# Patient Record
Sex: FEMALE | Race: BLACK OR AFRICAN AMERICAN | NOT HISPANIC OR LATINO | Employment: OTHER | URBAN - METROPOLITAN AREA
[De-identification: names, ages, dates, MRNs, and addresses within clinical notes are randomized per-mention and may not be internally consistent; named-entity substitution may affect disease eponyms.]

---

## 2018-03-21 ENCOUNTER — NURSING HOME VISIT (OUTPATIENT)
Dept: GERIATRICS | Facility: CLINIC | Age: 83
End: 2018-03-21
Payer: MEDICARE

## 2018-03-21 VITALS
HEART RATE: 56 BPM | SYSTOLIC BLOOD PRESSURE: 122 MMHG | WEIGHT: 178.2 LBS | BODY MASS INDEX: 31.57 KG/M2 | HEIGHT: 63 IN | TEMPERATURE: 97.5 F | OXYGEN SATURATION: 92 % | RESPIRATION RATE: 17 BRPM | DIASTOLIC BLOOD PRESSURE: 70 MMHG

## 2018-03-21 VITALS
SYSTOLIC BLOOD PRESSURE: 121 MMHG | BODY MASS INDEX: 31.57 KG/M2 | HEART RATE: 62 BPM | TEMPERATURE: 97.5 F | WEIGHT: 178.2 LBS | OXYGEN SATURATION: 94 % | HEIGHT: 63 IN | RESPIRATION RATE: 18 BRPM | DIASTOLIC BLOOD PRESSURE: 70 MMHG

## 2018-03-21 DIAGNOSIS — Z79.01 ANTICOAGULATION MANAGEMENT ENCOUNTER: ICD-10-CM

## 2018-03-21 DIAGNOSIS — Z51.81 ANTICOAGULATION MANAGEMENT ENCOUNTER: ICD-10-CM

## 2018-03-21 DIAGNOSIS — M17.0 PRIMARY OSTEOARTHRITIS OF BOTH KNEES: ICD-10-CM

## 2018-03-21 DIAGNOSIS — M35.3 PMR (POLYMYALGIA RHEUMATICA) (H): ICD-10-CM

## 2018-03-21 DIAGNOSIS — Z79.4 TYPE 2 DIABETES MELLITUS WITH DIABETIC NEUROPATHY, WITH LONG-TERM CURRENT USE OF INSULIN (H): ICD-10-CM

## 2018-03-21 DIAGNOSIS — I26.99 OTHER PULMONARY EMBOLISM WITHOUT ACUTE COR PULMONALE, UNSPECIFIED CHRONICITY (H): ICD-10-CM

## 2018-03-21 DIAGNOSIS — E11.40 TYPE 2 DIABETES MELLITUS WITH DIABETIC NEUROPATHY, WITH LONG-TERM CURRENT USE OF INSULIN (H): ICD-10-CM

## 2018-03-21 DIAGNOSIS — R53.81 PHYSICAL DECONDITIONING: ICD-10-CM

## 2018-03-21 DIAGNOSIS — M53.3 SACRO-ILIAC PAIN: Primary | ICD-10-CM

## 2018-03-21 DIAGNOSIS — I10 ESSENTIAL HYPERTENSION: ICD-10-CM

## 2018-03-21 DIAGNOSIS — D50.0 IRON DEFICIENCY ANEMIA DUE TO CHRONIC BLOOD LOSS: ICD-10-CM

## 2018-03-21 PROBLEM — I51.7 CARDIOMEGALY: Status: ACTIVE | Noted: 2018-03-21

## 2018-03-21 PROBLEM — K25.9: Status: ACTIVE | Noted: 2018-03-21

## 2018-03-21 PROBLEM — J98.01 ACUTE BRONCHOSPASM: Status: ACTIVE | Noted: 2018-03-21

## 2018-03-21 PROBLEM — J30.89 ALLERGIC RHINITIS DUE TO OTHER ALLERGEN: Status: ACTIVE | Noted: 2018-03-21

## 2018-03-21 PROBLEM — R20.9 DISTURBANCE OF SKIN SENSATION: Status: ACTIVE | Noted: 2018-03-21

## 2018-03-21 PROBLEM — H40.9 GLAUCOMA: Status: ACTIVE | Noted: 2018-03-21

## 2018-03-21 PROCEDURE — 99310 SBSQ NF CARE HIGH MDM 45: CPT | Performed by: NURSE PRACTITIONER

## 2018-03-21 RX ORDER — POLYETHYLENE GLYCOL 1450
17 POWDER (GRAM) MISCELLANEOUS DAILY
COMMUNITY

## 2018-03-21 RX ORDER — ALBUTEROL SULFATE 90 UG/1
2 AEROSOL, METERED RESPIRATORY (INHALATION) 4 TIMES DAILY PRN
COMMUNITY

## 2018-03-21 RX ORDER — TIMOLOL MALEATE 5 MG/ML
1 SOLUTION/ DROPS OPHTHALMIC DAILY
COMMUNITY

## 2018-03-21 RX ORDER — BISACODYL 10 MG
10 SUPPOSITORY, RECTAL RECTAL DAILY
COMMUNITY
End: 2018-03-28

## 2018-03-21 RX ORDER — LIDOCAINE 50 MG/G
1 PATCH TOPICAL EVERY 24 HOURS
COMMUNITY
End: 2018-03-28

## 2018-03-21 NOTE — PROGRESS NOTES
[place] [facility]  PRIMARY CARE PROVIDER AND CLINIC:  Laurence Abbott   Chief Complaint   Patient presents with     Hospital F/U       HPI:    Shira Gil is a 83 year old  (1935),admitted to the [facility] from [facility] .  Hospital stay 03/16/2018 through 03/20/2018.  Admitted to this facility for  rehab, medical management and nursing care.  HPI information obtained from: facility chart records, facility staff, patient report, [facility] chart review and Care Everywhere [facility] chart review.  Current issues are:      Sacro-iliac pain  Patient transferred to TCU for rehab following hospital stay due to left buttocks pain and pain in left leg. She was evaluated by ortho: no evidence of fracture. MRI of pelvis and lumbar spine- tendinosis and mild trochanteric edema bilaterally. Likely tendinosis is cause of pain. She was treated with flexeril, gabapentin and lidoderm patch. Today she reports less pain.   Knee pain- she does receive cortisone shots for this.   Type 2 diabetes mellitus with diabetic neuropathy, with long-term current use of insulin (H)  PTA taking lantus 26u and glipizide. BG in hospital running 130-260  BGs in TCU running around 100.   PMR (polymyalgia rheumatica) (H)  Remission for 2 years. No shoulder or back pain. Not on prednisone at this time     Other pulmonary embolism without acute cor pulmonale, unspecified chronicity (H)- patient describes two episodes of PULMONARY EMBOLISM. Second one occurring after coumadin stopped.   Anticoagulation management encounter  IVC filter in place.   INR today 4.   Essential hypertension  BP's: 122/70, 117/68  Taking PTA metop, lasix, losartan and norvasc  Iron deficiency anemia due to chronic blood loss  hgb 10.4 upon adm to hospital    EGD 2/2013 with dilated esophagus and large hiatal hernia      Physical deconditioning  Lives alone in senior apartment. Has supportive family. Has been mostly  independent. She uses a cane. Today able to walk to BR and back to bed.        CODE STATUS/ADVANCE DIRECTIVES DISCUSSION:   CPR/Full code   Patient's living condition: lives alone    ALLERGIES:Lisinopril  PAST MEDICAL HISTORY:  has no past medical history on file.  PAST SURGICAL HISTORY:  has no past surgical history on file.  FAMILY HISTORY: family history is not on file.  SOCIAL HISTORY:      Post Discharge Medication Reconciliation Status: discharge medications reconciled, continue medications without change.  Current Outpatient Prescriptions   Medication Sig Dispense Refill     ACETAMINOPHEN PO Take 1,000 mg by mouth 3 times daily Max dose 4000mg in 24 hrs       bisacodyl (DULCOLAX) 10 MG Suppository Place 10 mg rectally daily       FUROSEMIDE PO Take 20 mg by mouth 2 times daily       GABAPENTIN PO Take 200 mg by mouth 3 times daily For 14 days.       GLIPIZIDE PO Take 5 mg by mouth 2 times daily (before meals)       insulin glargine (LANTUS) 100 UNIT/ML injection Inject 26 Units Subcutaneous every 72 hours INCREASE BY 2 UNITS Q 3days UNTIL BEDTIME BLOOD SUGAR -140       lidocaine (LIDODERM) 5 % Patch Place 1 patch onto the skin every 24 hours       LOSARTAN POTASSIUM PO Take 100 mg by mouth daily       MECLIZINE HCL PO Take 25 mg by mouth 2 times daily as needed for dizziness       METOPROLOL TARTRATE PO Take 50 mg by mouth 2 times daily       AMLODIPINE BESYLATE PO Take 10 mg by mouth daily       OMEPRAZOLE PO Take 20 mg by mouth every morning       Polyethylene Glycol 1450 POWD Take 17 g by mouth daily as needed       PRAVASTATIN SODIUM PO Take 40 mg by mouth At Bedtime       albuterol (PROAIR HFA/PROVENTIL HFA/VENTOLIN HFA) 108 (90 BASE) MCG/ACT Inhaler Inhale 2 puffs into the lungs 4 times daily as needed for shortness of breath / dyspnea or wheezing       Sennosides 17.2 MG TABS Take 17.2 mg by mouth 2 times daily       timolol (TIMOPTIC) 0.5 % ophthalmic solution Place 1 drop into both eyes daily    "    VITAMIN D, CHOLECALCIFEROL, PO Take 2,000 Units by mouth daily       OXYBUTYNIN CHLORIDE PO Take 5 mg by mouth 2 times daily         ROS:  4 point ROS including Respiratory, CV, GI and , other than that noted in the HPI,  is negative    Exam:  /70  Pulse 56  Temp 97.5  F (36.4  C)  Resp 17  Ht 5' 3\" (1.6 m)  Wt 178 lb 3.2 oz (80.8 kg)  SpO2 92%  BMI 31.57 kg/m2  GENERAL APPEARANCE:  Alert, in no distress  ENT:  Mouth and posterior oropharynx normal, moist mucous membranes, hearing acuity adequate   EYES:  EOM, conjunctivae, lids, pupils and irises normal  NECK:  No adenopathy,masses or thyromegaly  RESP:  respiratory effort and palpation of chest normal, no respiratory distress, Lung sounds clear  CV:  Palpation and auscultation of heart done , rate and rhythm reg, no murmur, no rub or gallop, Edema none  ABDOMEN:  normal bowel sounds, soft, nontender, no hepatosplenomegaly or other masses  M/S:   Gait and station steady, Digits and nails normal   SKIN:  Inspection/Palpation of skin and subcutaneous tissue no rash  NEURO: 2-12 in normal limits and at patient's baseline  PSYCH:  insight and judgement, memory intact , affect and mood normal      Lab/Diagnostic data:  [facility] labs:  GLUCOSE METER (03/20/2018 12:37 PM)  GLUCOSE METER (03/20/2018 12:37 PM)   Component Value Ref Range   GLUCOSE METER 170 (H) 65 - 100 mg/dL     PROTIME-INR (03/20/2018 7:16 AM)  PROTIME-INR (03/20/2018 7:16 AM)   Component Value Ref Range   INR 3.3 (H) <1.3   PROTIME 32.7 (H) 11.7 - 14.1 sec     CBC WITH AUTO DIFFERENTIAL (03/17/2018 6:17 AM)  CBC WITH AUTO DIFFERENTIAL (03/17/2018 6:17 AM)   Component Value Ref Range   WHITE BLOOD COUNT  9.2 4.5 - 11.0 thou/cu mm   RED BLOOD COUNT  3.97 (L) 4.00 - 5.20 mil/cu mm   HEMOGLOBIN  10.1 (L) 12.0 - 16.0 g/dL   HEMATOCRIT  31.5 (L) 33.0 - 51.0 %   MCV  79 (L) 80 - 100 fL   MCH  25.4 (L) 26.0 - 34.0 pg   MCHC  32.1 32.0 - 36.0 g/dL   RDW  16.5 (H) 11.5 - 15.5 % "   PLATELET COUNT  226 140 - 440 thou/cu mm   MPV  10.8 6.5 - 11.0 fL   NEUTROPHILS  75.5 (H) 42.0 - 72.0 %   LYMPHOCYTES  16.5 (L) 20.0 - 44.0 %   MONOCYTES  6.5 <12.0 %   EOSINOPHILS  0.9 <8.0 %   BASOPHILS  0.1 <3.0 %   IMMATURE GRANULOCYTES(METAS,MYELOS,PROS) 0.5 %   ABSOLUTE NEUTROPHILS  7.0 1.7 - 7.0 thou/cu mm   ABSOLUTE LYMPHOCYTES  1.5 0.9 - 2.9 thou/cu mm   ABSOLUTE MONOCYTES  0.6 <0.9 thou/cu mm   ABSOLUTE EOSINOPHILS  0.1 <0.5 thou/cu mm   ABSOLUTE BASOPHILS  0.0 <0.3 thou/cu mm   ABSOLUTE IMMATURE GRANULOCYTES(METAS,MYELOS,PROS) 0.1 <0.3 thou/cu mm      Basic Metabolic Panel (03/17/2018 6:17 AM)  Basic Metabolic Panel (03/17/2018 6:17 AM)   Component Value Ref Range   SODIUM 138 135 - 145 mmol/L   POTASSIUM 4.2 3.5 - 5.0 mmol/L   CHLORIDE 108 98 - 110 mmol/L   CO2,TOTAL 22 21 - 31 mmol/L   ANION GAP 8 5 - 18    GLUCOSE 166 (H) 65 - 100 mg/dL   CALCIUM 8.7 8.5 - 10.5 mg/dL   BUN 19 8 - 25 mg/dL   CREATININE 1.05 0.57 - 1.11 mg/dL   BUN/CREAT RATIO  18 10 - 20    GFR if African American >60 >60 ml/min/1.73m2   GFR if not African American 50 (L) >60 ml/min/1.73m2       ASSESSMENT/PLAN:  (M53.3) Sacro-iliac pain  (primary encounter diagnosis)  Comment: recent hospitalization due to left buttocks pain now feeling better. She is moving well in her room  Plan: continue current pain meds. physical therapy     (M17.0) Primary osteoarthritis of both knees  Comment: no c/p pain today.   Plan: physical therapy     (E11.40,  Z79.4) Type 2 diabetes mellitus with diabetic neuropathy, with long-term current use of insulin (H)  Comment: BGs are well controlled. She did come to us with an order to increase lantus every 2 days but it appears this is an old order.   Plan: lantus 26u q am    (M35.3) PMR (polymyalgia rheumatica) (H)  Comment: asymptomatic   Plan: monitor     (I26.99) Other pulmonary embolism without acute cor pulmonale, unspecified chronicity (H)  (Z51.81,  Z79.01) Anticoagulation management  encounter  Comment: INR supratherapeutic.   Plan: hold coumadin for two days. Recheck INR 3/23    (I10) Essential hypertension  Comment: adequate control. Almost too low.   Plan: monitor BPs adjust meds as necessary    (D50.0) Iron deficiency anemia due to chronic blood loss  Comment: at baseline. Mention of gastric ulcer but I do not see any encounters regarding this  Plan: monitor     (R53.81) Physical deconditioning  Comment: due to musculoskeletal issue.   Plan: physical therapy and OCCUPATIONAL THERAPY. Suspect will be able to return to previous setting.     Orders:  DISCONTINUE lantus order to increase by 2 u q days.   Lantus 26u q day.  Hold coumadin for 2 days  Recheck INR 3/23    Bmp, CBC 3/23      Electronically signed by:  ARIN Mcmillan CNP

## 2018-03-21 NOTE — LETTER
3/21/2018        RE: Shira Gil  [address] [place] [address]        [place] [facility]  PRIMARY CARE PROVIDER AND CLINIC:  Ara Dang   Chief Complaint   Patient presents with     Hospital F/U       HPI:    Shira Gil is a 83 year old  (1935),admitted to the [facility] from [facility] .  Hospital stay 03/16/2018 through 03/20/2018.  Admitted to this facility for  rehab, medical management and nursing care.  HPI information obtained from: facility chart records, facility staff, patient report, [facility] chart review and Care Everywhere [facility] chart review.  Current issues are:      Sacro-iliac pain  Patient transferred to TCU for rehab following hospital stay due to left buttocks pain and pain in left leg. She was evaluated by ortho: no evidence of fracture. MRI of pelvis and lumbar spine- tendinosis and mild trochanteric edema bilaterally. Likely tendinosis is cause of pain. She was treated with flexeril, gabapentin and lidoderm patch. Today she reports less pain.   Knee pain- she does receive cortisone shots for this.   Type 2 diabetes mellitus with diabetic neuropathy, with long-term current use of insulin (H)  PTA taking lantus 26u and glipizide. BG in hospital running 130-260  BGs in TCU running around 100.   PMR (polymyalgia rheumatica) (H)  Remission for 2 years. No shoulder or back pain. Not on prednisone at this time     Other pulmonary embolism without acute cor pulmonale, unspecified chronicity (H)- patient describes two episodes of PULMONARY EMBOLISM. Second one occurring after coumadin stopped.   Anticoagulation management encounter  IVC filter in place.   INR today 4.   Essential hypertension  BP's: 122/70, 117/68  Taking PTA metop, lasix, losartan and norvasc  Iron deficiency anemia due to chronic blood loss  hgb 10.4 upon adm to hospital    EGD 2/2013 with dilated esophagus and large hiatal hernia      Physical  deconditioning  Lives alone in senior apartment. Has supportive family. Has been mostly independent. She uses a cane. Today able to walk to BR and back to bed.        CODE STATUS/ADVANCE DIRECTIVES DISCUSSION:   CPR/Full code   Patient's living condition: lives alone    ALLERGIES:Lisinopril  PAST MEDICAL HISTORY:  has no past medical history on file.  PAST SURGICAL HISTORY:  has no past surgical history on file.  FAMILY HISTORY: family history is not on file.  SOCIAL HISTORY:      Post Discharge Medication Reconciliation Status: discharge medications reconciled, continue medications without change.  Current Outpatient Prescriptions   Medication Sig Dispense Refill     ACETAMINOPHEN PO Take 1,000 mg by mouth 3 times daily Max dose 4000mg in 24 hrs       bisacodyl (DULCOLAX) 10 MG Suppository Place 10 mg rectally daily       FUROSEMIDE PO Take 20 mg by mouth 2 times daily       GABAPENTIN PO Take 200 mg by mouth 3 times daily For 14 days.       GLIPIZIDE PO Take 5 mg by mouth 2 times daily (before meals)       insulin glargine (LANTUS) 100 UNIT/ML injection Inject 26 Units Subcutaneous every 72 hours INCREASE BY 2 UNITS Q 3days UNTIL BEDTIME BLOOD SUGAR -140       lidocaine (LIDODERM) 5 % Patch Place 1 patch onto the skin every 24 hours       LOSARTAN POTASSIUM PO Take 100 mg by mouth daily       MECLIZINE HCL PO Take 25 mg by mouth 2 times daily as needed for dizziness       METOPROLOL TARTRATE PO Take 50 mg by mouth 2 times daily       AMLODIPINE BESYLATE PO Take 10 mg by mouth daily       OMEPRAZOLE PO Take 20 mg by mouth every morning       Polyethylene Glycol 1450 POWD Take 17 g by mouth daily as needed       PRAVASTATIN SODIUM PO Take 40 mg by mouth At Bedtime       albuterol (PROAIR HFA/PROVENTIL HFA/VENTOLIN HFA) 108 (90 BASE) MCG/ACT Inhaler Inhale 2 puffs into the lungs 4 times daily as needed for shortness of breath / dyspnea or wheezing       Sennosides 17.2 MG TABS Take 17.2 mg by mouth 2 times  "daily       timolol (TIMOPTIC) 0.5 % ophthalmic solution Place 1 drop into both eyes daily       VITAMIN D, CHOLECALCIFEROL, PO Take 2,000 Units by mouth daily       OXYBUTYNIN CHLORIDE PO Take 5 mg by mouth 2 times daily         ROS:  4 point ROS including Respiratory, CV, GI and , other than that noted in the HPI,  is negative    Exam:  /70  Pulse 56  Temp 97.5  F (36.4  C)  Resp 17  Ht 5' 3\" (1.6 m)  Wt 178 lb 3.2 oz (80.8 kg)  SpO2 92%  BMI 31.57 kg/m2  GENERAL APPEARANCE:  Alert, in no distress  ENT:  Mouth and posterior oropharynx normal, moist mucous membranes, hearing acuity adequate   EYES:  EOM, conjunctivae, lids, pupils and irises normal  NECK:  No adenopathy,masses or thyromegaly  RESP:  respiratory effort and palpation of chest normal, no respiratory distress, Lung sounds clear  CV:  Palpation and auscultation of heart done , rate and rhythm reg, no murmur, no rub or gallop, Edema none  ABDOMEN:  normal bowel sounds, soft, nontender, no hepatosplenomegaly or other masses  M/S:   Gait and station steady, Digits and nails normal   SKIN:  Inspection/Palpation of skin and subcutaneous tissue no rash  NEURO: 2-12 in normal limits and at patient's baseline  PSYCH:  insight and judgement, memory intact , affect and mood normal      Lab/Diagnostic data:  [facility] labs:  GLUCOSE METER (03/20/2018 12:37 PM)  GLUCOSE METER (03/20/2018 12:37 PM)   Component Value Ref Range   GLUCOSE METER 170 (H) 65 - 100 mg/dL     PROTIME-INR (03/20/2018 7:16 AM)  PROTIME-INR (03/20/2018 7:16 AM)   Component Value Ref Range   INR 3.3 (H) <1.3   PROTIME 32.7 (H) 11.7 - 14.1 sec     CBC WITH AUTO DIFFERENTIAL (03/17/2018 6:17 AM)  CBC WITH AUTO DIFFERENTIAL (03/17/2018 6:17 AM)   Component Value Ref Range   WHITE BLOOD COUNT  9.2 4.5 - 11.0 thou/cu mm   RED BLOOD COUNT  3.97 (L) 4.00 - 5.20 mil/cu mm   HEMOGLOBIN  10.1 (L) 12.0 - 16.0 g/dL   HEMATOCRIT  31.5 (L) 33.0 - 51.0 %   MCV  79 (L) 80 - 100 fL "   MCH  25.4 (L) 26.0 - 34.0 pg   MCHC  32.1 32.0 - 36.0 g/dL   RDW  16.5 (H) 11.5 - 15.5 %   PLATELET COUNT  226 140 - 440 thou/cu mm   MPV  10.8 6.5 - 11.0 fL   NEUTROPHILS  75.5 (H) 42.0 - 72.0 %   LYMPHOCYTES  16.5 (L) 20.0 - 44.0 %   MONOCYTES  6.5 <12.0 %   EOSINOPHILS  0.9 <8.0 %   BASOPHILS  0.1 <3.0 %   IMMATURE GRANULOCYTES(METAS,MYELOS,PROS) 0.5 %   ABSOLUTE NEUTROPHILS  7.0 1.7 - 7.0 thou/cu mm   ABSOLUTE LYMPHOCYTES  1.5 0.9 - 2.9 thou/cu mm   ABSOLUTE MONOCYTES  0.6 <0.9 thou/cu mm   ABSOLUTE EOSINOPHILS  0.1 <0.5 thou/cu mm   ABSOLUTE BASOPHILS  0.0 <0.3 thou/cu mm   ABSOLUTE IMMATURE GRANULOCYTES(METAS,MYELOS,PROS) 0.1 <0.3 thou/cu mm      Basic Metabolic Panel (03/17/2018 6:17 AM)  Basic Metabolic Panel (03/17/2018 6:17 AM)   Component Value Ref Range   SODIUM 138 135 - 145 mmol/L   POTASSIUM 4.2 3.5 - 5.0 mmol/L   CHLORIDE 108 98 - 110 mmol/L   CO2,TOTAL 22 21 - 31 mmol/L   ANION GAP 8 5 - 18    GLUCOSE 166 (H) 65 - 100 mg/dL   CALCIUM 8.7 8.5 - 10.5 mg/dL   BUN 19 8 - 25 mg/dL   CREATININE 1.05 0.57 - 1.11 mg/dL   BUN/CREAT RATIO  18 10 - 20    GFR if African American >60 >60 ml/min/1.73m2   GFR if not African American 50 (L) >60 ml/min/1.73m2       ASSESSMENT/PLAN:  (M53.3) Sacro-iliac pain  (primary encounter diagnosis)  Comment: recent hospitalization due to left buttocks pain now feeling better. She is moving well in her room  Plan: continue current pain meds. physical therapy     (M17.0) Primary osteoarthritis of both knees  Comment: no c/p pain today.   Plan: physical therapy     (E11.40,  Z79.4) Type 2 diabetes mellitus with diabetic neuropathy, with long-term current use of insulin (H)  Comment: BGs are well controlled. She did come to us with an order to increase lantus every 2 days but it appears this is an old order.   Plan: lantus 26u q am    (M35.3) PMR (polymyalgia rheumatica) (H)  Comment: asymptomatic   Plan: monitor     (I26.99) Other pulmonary embolism without acute cor pulmonale,  unspecified chronicity (H)  (Z51.81,  Z79.01) Anticoagulation management encounter  Comment: INR supratherapeutic.   Plan: hold coumadin for two days. Recheck INR 3/23    (I10) Essential hypertension  Comment: adequate control. Almost too low.   Plan: monitor BPs adjust meds as necessary    (D50.0) Iron deficiency anemia due to chronic blood loss  Comment: at baseline. Mention of gastric ulcer but I do not see any encounters regarding this  Plan: monitor     (R53.81) Physical deconditioning  Comment: due to musculoskeletal issue.   Plan: physical therapy and OCCUPATIONAL THERAPY. Suspect will be able to return to previous setting.     Orders:  DISCONTINUE lantus order to increase by 2 u q days.   Lantus 26u q day.  Hold coumadin for 2 days  Recheck INR 3/23    Bmp, CBC 3/23      Electronically signed by:  ARIN Mcmillan CNP                    Sincerely,        ARIN Mcmillan CNP

## 2018-03-22 ENCOUNTER — NURSING HOME VISIT (OUTPATIENT)
Dept: GERIATRICS | Facility: CLINIC | Age: 83
End: 2018-03-22
Payer: MEDICARE

## 2018-03-22 DIAGNOSIS — I10 ESSENTIAL HYPERTENSION: ICD-10-CM

## 2018-03-22 DIAGNOSIS — M53.3 SACRO-ILIAC PAIN: ICD-10-CM

## 2018-03-22 DIAGNOSIS — R53.81 PHYSICAL DECONDITIONING: Primary | ICD-10-CM

## 2018-03-22 DIAGNOSIS — M25.552 HIP PAIN, LEFT: ICD-10-CM

## 2018-03-22 DIAGNOSIS — E11.9 DM TYPE 2, GOAL HBA1C < 7% (H): ICD-10-CM

## 2018-03-22 PROCEDURE — 99305 1ST NF CARE MODERATE MDM 35: CPT | Performed by: INTERNAL MEDICINE

## 2018-03-22 NOTE — PROGRESS NOTES
PRIMARY CARE PROVIDER AND CLINIC RESPONSIBLE:  Ara Dang,         ADMISSION HISTORY AND PHYSICAL EXAMINATION     Chief Complaint   Patient presents with     Fatigue     Musculoskeletal Problem     Back Pain         HISTORY OF PRESENT ILLNESS:  83 year old female, (1935), [facility] of medical care and rehab.  HPI information obtained from: facility chart records, facility staff, patient report, [facility] chart review and Care Everywhere [facility] chart review.    Shira Gil is a 83 year old female with history of Type 2 DM, HTN, hx of PE, hx of gastric ulcer, PMR no longer on steroids, LEAH, VONNIE   who was admitted at [facility] from 3/16 to 3/20/18 due to left hip and buttock pain. Labs were unremarkable except for a CRP of 21. XR Left hip revealed degenerative changes but was otherwise unremarkable. She was admitted for pain control and PT/OT. MRI of pelvis and lumbar spine obtained due to pain continuing to be out of proportion to exam, revealed tendinosis and mild trochanteric edema bilaterally. Orthopedic team diagnosed tendinosis. She has decreased pain at left hip. She feels weak and tired. Slept well, appetite poor and had BM yesterday. Patient denies chest pain, dyspnea, abdominal pain, n&v, fever, chills, dysuria, leg swelling. The rest of ROS is unremarkable. Patient is seen and examined by me with Paulette Martin NP. Please see ALFONSO Martin's admit noted dated 3/20/18 for details of admission, past medical history, family history, allergies, medication list, social history and other details pertinent with this admission. Hospital admission and dc summary reviewed.    Patient Active Problem List    Diagnosis Date Noted     Allergic rhinitis due to other allergen 03/21/2018     Priority: Medium     Overview:   ALLERGIC TO DUST MITES       Acute bronchospasm 03/21/2018     Priority: Medium     Overview:   Managed with prn albuterol inhaler.         Cardiomegaly 03/21/2018     Priority: Medium     Overview:   LVH PER ECHO 12/01       Diabetes mellitus with neuropathy (H) 03/21/2018     Priority: Medium     Overview:   Diagnosed in the early 1980s  Stopped metformin when creatinine increased 2012.        Disturbance of skin sensation 03/21/2018     Priority: Medium     Overview:   LT ARM NUMBNESS 5/95 = CUS-NL        Glaucoma 03/21/2018     Priority: Medium     Pulmonary embolism (H) 03/21/2018     Priority: Medium     Overview:   Two episodes. 1996, treated with warfarin for six months, and then 1997. Then an IVC filter was placed. On warfarin for life.        Ulcer of gastric fundus, unspecified ulcer chronicity 03/21/2018     Priority: Medium     Overview:   GASTRIC ULCER 5/95 +2/00        Essential hypertension 03/21/2018     Priority: Medium     Overview:   2012 the creatinine increased and hydrochlorothiazide, losartan were stopped. Metformin was also stopped       Anticoagulation management encounter 03/21/2018     Priority: Medium     Physical deconditioning 03/21/2018     Priority: Medium     Primary osteoarthritis of both knees 03/21/2018     Priority: Medium     Sacro-iliac pain 03/16/2018     Priority: Medium     Obesity, Class II, BMI 35-39.9 10/26/2016     Priority: Medium     Nasal polyp 10/23/2014     Priority: Medium     Overview:   uses inhaled nasal steroids.  SURGERY 12/06       Edema 05/29/2014     Priority: Medium     Overview:   Probably diastolic dysfunction and pulmonary htn.        LEAH (obstructive sleep apnea) 05/29/2014     Priority: Medium     Overview:   Should be on CPAP. Working on it 2014. Looking for an affordable option.        Dysphagia 02/26/2013     Priority: Medium     Overview:   EGD 2/2013 with dilated esophagus and large hiatal hernia        Elevated uric acid in blood 12/20/2012     Priority: Medium     Basal cell carcinoma 09/21/2012     Priority: Medium     Degenerative arthritis of knee 05/25/2012     Priority:  Medium     Overview:   Osteoarthritis per X-Rays.   Had right knee corticosteroid injection 7/2011  Hospitalized with left knee swelling and pain 5/24/12. Osteoarthritis. Better with steroid injection and Physical Therapy. Might need total knee arthroplasty.        Bilateral carpal tunnel syndrome 05/05/2010     Priority: Medium     Overview:   5/2010: EMG with [facility]. Follow up with hand surgeon as outpatient.       PMR (polymyalgia rheumatica) (H) 05/05/2010     Priority: Medium     Overview:   5/2010: Elevated ESR/CRP, girdle weakness. Seen by Rheumatology, started on 15mg prednisone daily. Dr Palacios.   Off prednisone in early 2014. No symptoms.        Schatzki's ring 05/05/2010     Priority: Medium     Overview:   5/2010: EGD. Nonobstructive. 5/11 = dilated       Iron deficiency anemia due to chronic blood loss 07/15/2008     Priority: Medium     Overview:   HG 11.0  FERRITIN 17; 7/08       Routine general medical examination at a health care facility 01/31/2005     Priority: Medium     Overview:   LAST PE 6/28/04, 9/16/05; 10/9/06; 10/23/07; 6/19/09; 9/15/11; 9/21/2012; 9/24/2013; 11/19/2015; 11/28/2016    COLONOSCOPY 1/99; 8/19/08=multiple adenomatous polyps. 7-27-09=Tubular adenoma; 9/18/14 = polyps. Repeat in 5 yrs. (scanned)  MAMMOGRAM 7/04; 11/24/06  DEXA Scan; 9/11/12= normal, per Rheumatology.        Hyperlipidemia 01/31/2005     Priority: Medium       Current Outpatient Prescriptions   Medication Sig     ACETAMINOPHEN PO Take 1,000 mg by mouth 3 times daily Max dose 4000mg in 24 hrs     bisacodyl (DULCOLAX) 10 MG Suppository Place 10 mg rectally daily     FUROSEMIDE PO Take 20 mg by mouth 2 times daily     GABAPENTIN PO Take 200 mg by mouth 3 times daily For 14 days.     GLIPIZIDE PO Take 5 mg by mouth 2 times daily (before meals)     insulin glargine (LANTUS) 100 UNIT/ML injection Inject 26 Units Subcutaneous daily     lidocaine (LIDODERM) 5 % Patch Place 1 patch onto the skin every 24 hours     LOSARTAN  "POTASSIUM PO Take 100 mg by mouth daily     MECLIZINE HCL PO Take 25 mg by mouth 2 times daily as needed for dizziness     METOPROLOL TARTRATE PO Take 50 mg by mouth 2 times daily     AMLODIPINE BESYLATE PO Take 10 mg by mouth daily     OMEPRAZOLE PO Take 20 mg by mouth every morning     Polyethylene Glycol 1450 POWD Take 17 g by mouth daily as needed     PRAVASTATIN SODIUM PO Take 40 mg by mouth At Bedtime     albuterol (PROAIR HFA/PROVENTIL HFA/VENTOLIN HFA) 108 (90 BASE) MCG/ACT Inhaler Inhale 2 puffs into the lungs 4 times daily as needed for shortness of breath / dyspnea or wheezing     Sennosides 17.2 MG TABS Take 17.2 mg by mouth 2 times daily     timolol (TIMOPTIC) 0.5 % ophthalmic solution Place 1 drop into both eyes daily     VITAMIN D, CHOLECALCIFEROL, PO Take 2,000 Units by mouth daily     OXYBUTYNIN CHLORIDE PO Take 5 mg by mouth 2 times daily     No current facility-administered medications for this visit.        Allergies   Allergen Reactions     Lisinopril        Social History     Social History     Marital status:      Spouse name: N/A     Number of children: N/A     Years of education: N/A     Occupational History     Not on file.     Social History Main Topics     Smoking status: Not on file     Smokeless tobacco: Not on file     Alcohol use Not on file     Drug use: Not on file     Sexual activity: Not on file     Other Topics Concern     Not on file     Social History Narrative          Information reviewed:  Medications, vital signs, orders, nursing notes, problem list, hospital information.     ROS: All 10 point review of system completed, those pertinent positive, please see H&P, the remaining ROS is negative.    /70  Pulse 62  Temp 97.5  F (36.4  C)  Resp 18  Ht 5' 3\" (1.6 m)  Wt 178 lb 3.2 oz (80.8 kg)  SpO2 94%  BMI 31.57 kg/m2    PHYSICAL EXAMINATION:   GENERAL:  No acute distress.  SKIN:  Dry and warm.  There are freckles at face, chronic skin change of lower " ext.  HEENT:  Head without trauma.  Pupils round, reactive. Exam of conjunctiva and lids are normal. Sclera without icterus. There is no oral thrush.  NECK:  Supple. No jugular venous distension.  CHEST: No reproducible chest tenderness.   LUNGS:  Normal respiratory effort. Lungs reveal decreased breath sounds at bases. No rales or wheezes.  HEART:  Regular rate and rhythm.  No murmur, gallops or rubs auscultated.  ABDOMEN:  Soft, bowel sounds positive.  There is no tenderness or guarding.   EXTREMITIES: trace edema. Mild left hip tenderness with movement.  NEUROLOGIC:  Alert and oriented x3. Moving all ext. Gait not tested.  PSYCH: Recent and remote memory is normal. Mood and affect are normal.    Lab/Diagnostic data:  Reviewed    [facility] labs:  GLUCOSE METER (03/20/2018 12:37 PM)       GLUCOSE METER (03/20/2018 12:37 PM)   Component Value Ref Range   GLUCOSE METER 170 (H) 65 - 100 mg/dL      PROTIME-INR (03/20/2018 7:16 AM)       PROTIME-INR (03/20/2018 7:16 AM)   Component Value Ref Range   INR 3.3 (H) <1.3   PROTIME 32.7 (H) 11.7 - 14.1 sec      CBC WITH AUTO DIFFERENTIAL (03/17/2018 6:17 AM)       CBC WITH AUTO DIFFERENTIAL (03/17/2018 6:17 AM)   Component Value Ref Range   WHITE BLOOD COUNT  9.2 4.5 - 11.0 thou/cu mm   RED BLOOD COUNT  3.97 (L) 4.00 - 5.20 mil/cu mm   HEMOGLOBIN  10.1 (L) 12.0 - 16.0 g/dL   HEMATOCRIT  31.5 (L) 33.0 - 51.0 %   MCV  79 (L) 80 - 100 fL   MCH  25.4 (L) 26.0 - 34.0 pg   MCHC  32.1 32.0 - 36.0 g/dL   RDW  16.5 (H) 11.5 - 15.5 %   PLATELET COUNT  226 140 - 440 thou/cu mm   MPV  10.8 6.5 - 11.0 fL   NEUTROPHILS  75.5 (H) 42.0 - 72.0 %   LYMPHOCYTES  16.5 (L) 20.0 - 44.0 %   MONOCYTES  6.5 <12.0 %   EOSINOPHILS  0.9 <8.0 %   BASOPHILS  0.1 <3.0 %   IMMATURE GRANULOCYTES(METAS,MYELOS,PROS) 0.5 %   ABSOLUTE NEUTROPHILS  7.0 1.7 - 7.0 thou/cu mm   ABSOLUTE LYMPHOCYTES  1.5 0.9 - 2.9 thou/cu mm   ABSOLUTE MONOCYTES  0.6 <0.9 thou/cu mm   ABSOLUTE EOSINOPHILS  0.1 <0.5 thou/cu  mm   ABSOLUTE BASOPHILS  0.0 <0.3 thou/cu mm   ABSOLUTE IMMATURE GRANULOCYTES(METAS,MYELOS,PROS) 0.1 <0.3 thou/cu mm       Basic Metabolic Panel (03/17/2018 6:17 AM)       Basic Metabolic Panel (03/17/2018 6:17 AM)   Component Value Ref Range   SODIUM 138 135 - 145 mmol/L   POTASSIUM 4.2 3.5 - 5.0 mmol/L   CHLORIDE 108 98 - 110 mmol/L   CO2,TOTAL 22 21 - 31 mmol/L   ANION GAP 8 5 - 18    GLUCOSE 166 (H) 65 - 100 mg/dL   CALCIUM 8.7 8.5 - 10.5 mg/dL   BUN 19 8 - 25 mg/dL   CREATININE 1.05 0.57 - 1.11 mg/dL   BUN/CREAT RATIO  18 10 - 20    GFR if African American >60 >60 ml/min/1.73m2   GFR if not African American 50 (L) >60 ml/min/1.73m2          ASSESSMENT / PLAN:     Physical deconditioning  Plan: PT/OT with increase independence, self-care, strength and endurance and other cares that help return to home/facility of origin, fall precautions. Care conference with patient and family for the progress of rehab and disposition issues will be discussed as planned. Rehab evaluation and other evaluations including CPT are at rehab logs, to be reviewed separately.  Fall risk assessment as well as cognitive evaluation will be formed during rehab stay if indicated.    Sacro-iliac pain and Hip pain, left  Plan: Continue PT/OT and pain medications, fall precaution.    DM type 2, goal HbA1c < 7% (H)  Plan: continue current medications glipizide and Lantus, diet and monitor BG closely.    Essential hypertension  Plan: Continue Novasc, Losartan and metoprolol with parameters. Blood pressure stable.    Other problems with same care. Primary care doctor and other specialists to address those chronic problems in next clinic appointment to be scheduled upon discharge from the TCU.    Total time spent with patient visit was 35 min including patient visit, review of past records, patients counseling and coordinating care.        Palomo Varghese MD

## 2018-03-23 ENCOUNTER — TRANSFERRED RECORDS (OUTPATIENT)
Dept: HEALTH INFORMATION MANAGEMENT | Facility: CLINIC | Age: 83
End: 2018-03-23

## 2018-03-23 ENCOUNTER — NURSING HOME VISIT (OUTPATIENT)
Dept: GERIATRICS | Facility: CLINIC | Age: 83
End: 2018-03-23
Payer: MEDICARE

## 2018-03-23 VITALS
RESPIRATION RATE: 18 BRPM | DIASTOLIC BLOOD PRESSURE: 79 MMHG | WEIGHT: 178.2 LBS | SYSTOLIC BLOOD PRESSURE: 127 MMHG | OXYGEN SATURATION: 97 % | HEART RATE: 68 BPM | TEMPERATURE: 96.6 F | BODY MASS INDEX: 31.57 KG/M2 | HEIGHT: 63 IN

## 2018-03-23 DIAGNOSIS — Z51.81 ENCOUNTER FOR THERAPEUTIC DRUG MONITORING: ICD-10-CM

## 2018-03-23 DIAGNOSIS — I26.99 OTHER PULMONARY EMBOLISM WITHOUT ACUTE COR PULMONALE, UNSPECIFIED CHRONICITY (H): Primary | ICD-10-CM

## 2018-03-23 DIAGNOSIS — Z79.01 LONG-TERM (CURRENT) USE OF ANTICOAGULANTS: ICD-10-CM

## 2018-03-23 LAB
ANION GAP SERPL CALCULATED.3IONS-SCNC: 8 MMOL/L (ref 3–17)
BUN SERPL-MCNC: 36 MG/DL (ref 7–30)
CALCIUM SERPL-MCNC: 8.6 MG/DL (ref 8.5–10.1)
CHLORIDE SERPLBLD-SCNC: 109 MMOL/L (ref 94–109)
CO2 SERPL-SCNC: 23 MMOL/L (ref 20–32)
CREAT SERPL-MCNC: 1.42 MG/DL (ref 0.52–1.04)
ERYTHROCYTE [DISTWIDTH] IN BLOOD BY AUTOMATED COUNT: 16.8 % (ref 10–15)
GFR SERPL CREATININE-BSD FRML MDRD: 35 ML/MIN/1.73M2
GLUCOSE SERPL-MCNC: 129 MG/DL (ref 70–99)
HCT VFR BLD AUTO: 35.3 % (ref 35–47)
HEMOGLOBIN: 11.2 G/DL (ref 11.7–15.7)
MCH RBC QN AUTO: 24.9 PG (ref 26.5–33)
MCHC RBC AUTO-ENTMCNC: 31.7 G/DL (ref 31.5–36.5)
MCV RBC AUTO: 78 FL (ref 78–100)
PLATELET # BLD AUTO: 360 10E9/L (ref 150–450)
POTASSIUM SERPL-SCNC: 4.1 MMOL/L (ref 3.4–5.3)
RBC # BLD AUTO: 4.5 10E12/L (ref 3.8–5.2)
SODIUM SERPL-SCNC: 140 MMOL/L (ref 133–144)
WBC # BLD AUTO: 7.9 10E9/L (ref 4–11)

## 2018-03-23 PROCEDURE — 99308 SBSQ NF CARE LOW MDM 20: CPT | Performed by: NURSE PRACTITIONER

## 2018-03-23 NOTE — PROGRESS NOTES
[facility]    HPI:    Shira Gil is a 83 year old  (1935), who is being seen today for an episodic care visit at [facility].   HPI information obtained from: facility chart records, facility staff, patient report and [facility] chart review. Today's concern is INR/Coumadin management for PE    Bleeding Signs/Symptoms:  None  Thromboembolic Signs/Symptoms:  None    Medication Changes:  No  Dietary Changes:  No  Activity Changes: No  Bacterial/Viral Infection:  No    Missed Coumadin Doses:  Hold for two days    On ASA: No    Other Concerns:  No    OBJECTIVE:    INR Today:  2.0   Current Dose:  Held 3/21 and 3/22    ASSESSMENT:     Other pulmonary embolism without acute cor pulmonale, unspecified chronicity (H)  Long-term (current) use of anticoagulants  Encounter for therapeutic drug monitoring  Therapeutic INR for goal of 2-3    PLAN:    New Dose: 1mg q day      Next INR: 3/26        Electronically signed by:  ARIN Mcmillan CNP

## 2018-03-26 ENCOUNTER — NURSING HOME VISIT (OUTPATIENT)
Dept: GERIATRICS | Facility: CLINIC | Age: 83
End: 2018-03-26
Payer: MEDICARE

## 2018-03-26 VITALS
BODY MASS INDEX: 31.82 KG/M2 | RESPIRATION RATE: 16 BRPM | DIASTOLIC BLOOD PRESSURE: 70 MMHG | HEIGHT: 63 IN | WEIGHT: 179.6 LBS | OXYGEN SATURATION: 95 % | TEMPERATURE: 99.4 F | SYSTOLIC BLOOD PRESSURE: 127 MMHG | HEART RATE: 60 BPM

## 2018-03-26 DIAGNOSIS — Z79.01 LONG-TERM (CURRENT) USE OF ANTICOAGULANTS: ICD-10-CM

## 2018-03-26 DIAGNOSIS — I26.99 OTHER PULMONARY EMBOLISM WITHOUT ACUTE COR PULMONALE, UNSPECIFIED CHRONICITY (H): Primary | ICD-10-CM

## 2018-03-26 DIAGNOSIS — Z51.81 ENCOUNTER FOR THERAPEUTIC DRUG MONITORING: ICD-10-CM

## 2018-03-26 PROCEDURE — 99308 SBSQ NF CARE LOW MDM 20: CPT | Performed by: NURSE PRACTITIONER

## 2018-03-26 NOTE — PROGRESS NOTES
"  [facility]    HPI:    Shira Gil is a 83 year old  (1935), who is being seen today for an episodic care visit at [facility].   HPI information obtained from: facility chart records, facility staff, patient report and [facility] chart review. Today's concern is INR/Coumadin management for PE    Bleeding Signs/Symptoms:  None  Thromboembolic Signs/Symptoms:  None    Medication Changes:  Yes: coumadin held several days  Dietary Changes:  Yes: now at TCU  Activity Changes: Yes: now at TCU  Bacterial/Viral Infection:  No    Missed Coumadin Doses:  This week - held Coumadin 3/21 and 3/22    On ASA: No    Other Concerns:  No    OBJECTIVE:  /70  Pulse 60  Temp 99.4  F (37.4  C)  Resp 16  Ht 5' 3\" (1.6 m)  Wt 179 lb 9.6 oz (81.5 kg)  SpO2 95%  BMI 31.81 kg/m2 INR Today:  1.6  Current Dose:  1mg QD x 3 days    ASSESSMENT:  1. Other pulmonary embolism without acute cor pulmonale, unspecified chronicity (H)    2. Long-term (current) use of anticoagulants    3. Encounter for therapeutic drug monitoring      Subtherapeutic INR for goal of 2-3    PLAN:    New Dose: Coumadin 1.5 mg PO daily      Next INR: 3/28/18    Electronically signed by:  ARIN Wright CNP        "

## 2018-03-26 NOTE — MR AVS SNAPSHOT
"              After Visit Summary   3/26/2018    Shira Gil    MRN: [id number]           Patient Information     Date Of Birth          1935        Visit Information        Provider Department      3/26/2018 10:00 AM Amelia Farooq APRN CNP [facility]        Today's Diagnoses     Other pulmonary embolism without acute cor pulmonale, unspecified chronicity (H)    -  1    Long-term (current) use of anticoagulants        Encounter for therapeutic drug monitoring           Follow-ups after your visit        Your next 10 appointments already scheduled     Mar 26, 2018 10:00 AM CDT   Nursing Home with ARIN Andino CNP   [facility] ([facility])    [address] [id number] [phone]              Who to contact     If you have questions or need follow up information about today's clinic visit or your schedule please contact G[facility] directly at [phone].  Normal or non-critical lab and imaging results will be communicated to you by MyChart, letter or phone within 4 business days after the clinic has received the results. If you do not hear from us within 7 days, please contact the clinic through [employer] or phone. If you have a critical or abnormal lab result, we will notify you by phone as soon as possible.  Submit refill requests through [employer] or call your pharmacy and they will forward the refill request to us. Please allow 3 business days for your refill to be completed.          Additional Information About Your Visit        [employer] Information     [employer] lets you send messages to your doctor, view your test results, renew your prescriptions, schedule appointments and more. To sign up, go to [email or web address] . Click on \"Log in\" on the left side of the screen, which will take you to the Welcome page. Then click on \"Sign up Now\" on the right side of the page.     You will be asked to enter " "the access code listed below, as well as some personal information. Please follow the directions to create your username and password.     Your access code is: [email or web address]  Expires: 2018  9:47 AM     Your access code will  in 90 days. If you need help or a new code, please call your [place] clinic or [phone].        Care EveryWhere ID     This is your Care EveryWhere ID. This could be used by other organizations to access your [place] medical records  [id number]        Your Vitals Were     Pulse Temperature Respirations Height Pulse Oximetry BMI (Body Mass Index)    60 99.4  F (37.4  C) 16 5' 3\" (1.6 m) 95% 31.81 kg/m2       Blood Pressure from Last 3 Encounters:   18 127/70   18 127/79   18 121/70    Weight from Last 3 Encounters:   18 179 lb 9.6 oz (81.5 kg)   18 178 lb 3.2 oz (80.8 kg)   18 178 lb 3.2 oz (80.8 kg)              Today, you had the following     No orders found for display       Primary Care Provider    [facility]       No address on file        Equal Access to Services     TIM HERNANDEZ : Hadii bethany Le, waaxda brandon, qaybta kaalmada adebrianda, tatiana mckeon . So [facility] [phone].    ATENCIÓN: Si habla español, tiene a higuera disposición servicios gratuitos de asistencia lingüística. Llame al [phone].    We comply with applicable federal civil rights laws and [place] laws. We do not discriminate on the basis of race, color, national origin, age, disability, sex, sexual orientation, or gender identity.            Thank you!     Thank you for choosing G[facility]  for your care. Our goal is always to provide you with excellent care. Hearing back from our patients is one way we can continue to improve our services. Please take a few minutes to complete the written survey that you may receive in the mail after your visit with us. Thank you!             Your Updated " Medication List - Protect others around you: Learn how to safely use, store and throw away your medicines at www.disposemymeds.org.          This list is accurate as of 3/26/18  9:47 AM.  Always use your most recent med list.                   Brand Name Dispense Instructions for use Diagnosis    ACETAMINOPHEN PO      Take 1,000 mg by mouth 3 times daily Max dose 4000mg in 24 hrs        albuterol 108 (90 BASE) MCG/ACT Inhaler    PROAIR HFA/PROVENTIL HFA/VENTOLIN HFA     Inhale 2 puffs into the lungs 4 times daily as needed for shortness of breath / dyspnea or wheezing        AMLODIPINE BESYLATE PO      Take 10 mg by mouth daily        bisacodyl 10 MG Suppository    DULCOLAX     Place 10 mg rectally daily        COUMADIN PO      Take 1 mg by mouth daily        FUROSEMIDE PO      Take 20 mg by mouth 2 times daily        GABAPENTIN PO      Take 200 mg by mouth 3 times daily For 14 days.        GLIPIZIDE PO      Take 5 mg by mouth 2 times daily (before meals)        insulin glargine 100 UNIT/ML injection    LANTUS     Inject 26 Units Subcutaneous daily        lidocaine 5 % Patch    LIDODERM     Place 1 patch onto the skin every 24 hours        LOSARTAN POTASSIUM PO      Take 100 mg by mouth daily        MECLIZINE HCL PO      Take 25 mg by mouth 2 times daily as needed for dizziness        METOPROLOL TARTRATE PO      Take 50 mg by mouth 2 times daily        OMEPRAZOLE PO      Take 20 mg by mouth every morning        OXYBUTYNIN CHLORIDE PO      Take 5 mg by mouth 2 times daily        Polyethylene Glycol 1450 Powd      Take 17 g by mouth daily as needed        PRAVASTATIN SODIUM PO      Take 40 mg by mouth At Bedtime        Sennosides 17.2 MG Tabs      Take 17.2 mg by mouth 2 times daily        timolol 0.5 % ophthalmic solution    TIMOPTIC     Place 1 drop into both eyes daily        VITAMIN D (CHOLECALCIFEROL) PO      Take 2,000 Units by mouth daily

## 2018-03-28 ENCOUNTER — NURSING HOME VISIT (OUTPATIENT)
Dept: GERIATRICS | Facility: CLINIC | Age: 83
End: 2018-03-28
Payer: MEDICARE

## 2018-03-28 VITALS
RESPIRATION RATE: 17 BRPM | HEART RATE: 69 BPM | OXYGEN SATURATION: 95 % | BODY MASS INDEX: 31.82 KG/M2 | WEIGHT: 179.6 LBS | TEMPERATURE: 99.4 F | DIASTOLIC BLOOD PRESSURE: 77 MMHG | SYSTOLIC BLOOD PRESSURE: 130 MMHG | HEIGHT: 63 IN

## 2018-03-28 DIAGNOSIS — R79.1 SUBTHERAPEUTIC INTERNATIONAL NORMALIZED RATIO (INR): ICD-10-CM

## 2018-03-28 DIAGNOSIS — I26.99 OTHER PULMONARY EMBOLISM WITHOUT ACUTE COR PULMONALE, UNSPECIFIED CHRONICITY (H): ICD-10-CM

## 2018-03-28 DIAGNOSIS — Z51.81 ANTICOAGULATION MANAGEMENT ENCOUNTER: ICD-10-CM

## 2018-03-28 DIAGNOSIS — D50.0 IRON DEFICIENCY ANEMIA DUE TO CHRONIC BLOOD LOSS: ICD-10-CM

## 2018-03-28 DIAGNOSIS — R42 VERTIGO: ICD-10-CM

## 2018-03-28 DIAGNOSIS — M35.3 PMR (POLYMYALGIA RHEUMATICA) (H): ICD-10-CM

## 2018-03-28 DIAGNOSIS — Z79.01 ANTICOAGULATION MANAGEMENT ENCOUNTER: ICD-10-CM

## 2018-03-28 DIAGNOSIS — I10 ESSENTIAL HYPERTENSION: ICD-10-CM

## 2018-03-28 DIAGNOSIS — E11.9 DM TYPE 2, GOAL HBA1C < 7% (H): ICD-10-CM

## 2018-03-28 DIAGNOSIS — M53.3 SACRO-ILIAC PAIN: Primary | ICD-10-CM

## 2018-03-28 DIAGNOSIS — M25.569 KNEE PAIN, UNSPECIFIED CHRONICITY, UNSPECIFIED LATERALITY: ICD-10-CM

## 2018-03-28 DIAGNOSIS — R53.81 PHYSICAL DECONDITIONING: ICD-10-CM

## 2018-03-28 PROCEDURE — 99310 SBSQ NF CARE HIGH MDM 45: CPT | Performed by: NURSE PRACTITIONER

## 2018-03-28 NOTE — PROGRESS NOTES
[facility]    Chief Complaint   Patient presents with     RECHECK       HPI:    Shira Gil is a 83 year old  (1935), who is being seen today for an episodic care visit at [facility].  HPI information obtained from: facility chart records, facility staff, patient report and [facility] chart review.    Today's concern is:  Sacro-iliac pain  Knee pain  Patient transferred to TCU for rehab following hospital stay due to left buttocks pain and pain in left leg. She was evaluated by ortho: no evidence of fracture. MRI of pelvis and lumbar spine- tendinosis and mild trochanteric edema bilaterally. Likely tendinosis is cause of pain. She was treated with flexeril, gabapentin and Lidoderm patch. She also has chronic knee pain treated with cortisone injections.   --reports pain completely absent today. On tylenol, gabapentin.     Type 2 diabetes mellitus with diabetic neuropathy, with long-term current use of insulin (H)  PTA taking Lantus and glipizide. Now on Lantus 26 units daily and Glipizide 5 mg PO BID. Last BG Levels:    AM:     Noon:     Dinner:     HS:     PMR (polymyalgia rheumatica) (H)  Remission for 2 years. No shoulder or back pain. Not on prednisone at this time     Subtherapeutic international normalized ratio (INR)   Other pulmonary embolism without acute cor pulmonale, unspecified chronicity (H)- patient describes two episodes of PULMONARY EMBOLISM. Second one occurring after coumadin stopped.   Anticoagulation management encounter  IVC filter in place. INR today low at 1.4, Has taken 1 mg of coumadin x 3 days, 1.5 mg x 2 days and Coumadin held x 2 days in the past week.     Essential hypertension  Taking PTA metop, lasix, losartan and Norvasc. BP Range: 103-146/61-96, HR Range: 56-75 bpm, Wt Range: 178.2lbs 3/20 - 179.6lbs today  Lab Results   Component Value Date    CR 1.42 03/23/2018       Iron deficiency anemia due to chronic blood  loss  hgb 10.4 upon adm to hospital and  EGD 2/2013 with dilated esophagus and large hiatal hernia.    Lab Results   Component Value Date    HGB 11.2 03/23/2018       Physical deconditioning  Lives alone in senior apartment. Has supportive family.  She uses a cane. Progressing in therapies - hopes to DC this weekend    Vertigo  Reports chronic issue, currently on PRN meclizine and in the process of work up with balance center. Has had one episode of vertigo at TCU but meclizine effective in treating.       ALLERGIES: Lisinopril  Past Medical, Surgical, Family and Social History reviewed and [facility] in [facility].    Current Outpatient Prescriptions   Medication Sig Dispense Refill     ACETAMINOPHEN PO Take 1,000 mg by mouth 3 times daily Max dose 4000mg in 24 hrs       FUROSEMIDE PO Take 20 mg by mouth 2 times daily       GABAPENTIN PO Take 200 mg by mouth 3 times daily For 14 days.       GLIPIZIDE PO Take 5 mg by mouth 2 times daily (before meals)       insulin glargine (LANTUS) 100 UNIT/ML injection Inject 26 Units Subcutaneous daily       LOSARTAN POTASSIUM PO Take 100 mg by mouth daily       MECLIZINE HCL PO Take 25 mg by mouth 2 times daily as needed for dizziness       METOPROLOL TARTRATE PO Take 50 mg by mouth 2 times daily       AMLODIPINE BESYLATE PO Take 10 mg by mouth daily       OMEPRAZOLE PO Take 20 mg by mouth every morning       Polyethylene Glycol 1450 POWD Take 17 g by mouth daily        PRAVASTATIN SODIUM PO Take 40 mg by mouth At Bedtime       albuterol (PROAIR HFA/PROVENTIL HFA/VENTOLIN HFA) 108 (90 BASE) MCG/ACT Inhaler Inhale 2 puffs into the lungs 4 times daily as needed for shortness of breath / dyspnea or wheezing       Sennosides 17.2 MG TABS Take 17.2 mg by mouth 2 times daily       timolol (TIMOPTIC) 0.5 % ophthalmic solution Place 1 drop into both eyes daily       VITAMIN D, CHOLECALCIFEROL, PO Take 2,000 Units by mouth daily       OXYBUTYNIN CHLORIDE PO Take 5 mg by mouth 2 times daily        "Warfarin Sodium (COUMADIN PO) Take 1 mg by mouth daily       Medications reviewed:  Medications reconciled to facility chart and changes were made to reflect current medications as identified as above med list. Below are the changes that were made:   Medications stopped since last EPIC medication reconciliation:   Medications Discontinued During This Encounter   Medication Reason     lidocaine (LIDODERM) 5 % Patch      bisacodyl (DULCOLAX) 10 MG Suppository        Medications started since last [facility] medication reconciliation:  No orders of the defined types were placed in this encounter.    REVIEW OF SYSTEMS:  10 point ROS of systems including Constitutional, Eyes, Respiratory, Cardiovascular, Gastroenterology, Genitourinary, Integumentary, Musculoskeletal, Psychiatric were all negative except for pertinent positives noted in my HPI.    Physical Exam:  /77  Pulse 69  Temp 99.4  F (37.4  C)  Resp 17  Ht 5' 3\" (1.6 m)  Wt 179 lb 9.6 oz (81.5 kg)  SpO2 95%  BMI 31.81 kg/m2  GENERAL APPEARANCE:  Alert, in no distress, pleasant, cooperative, oriented x 3  EYES:  EOM, lids, pupils and irises normal, sclera clear and conjunctiva normal, no discharge or mattering on lids or lashes noted  ENT:  Mouth normal, moist mucous membranes, nose normal without drainage or crusting, external ears without lesions, hearing acuity intact  RESP:  respiratory effort and palpation of chest normal, no chest wall tenderness, no respiratory distress, Lung sounds clear, patient is on room air  CV:  Palpation and auscultation of heart done, rate and rhythm controlled and regular, no murmur, no rub or gallop. Edema +2 pitting bilateral lower extremities. VASCULAR: no open areas lower legs  ABDOMEN:  normal bowel sounds, soft, nontender, no grimacing or guarding with palpation, no hepatosplenomegaly or other masses  M/S:   Gait and station ambulates independently with walker, Digits and nails normal, no tenderness or swelling of the " joints; able to move all extremities   NEURO: cranial nerves 2-12 grossly intact, no facial asymmetry, no speech deficits and able to follow directions, moves all extremities symmetrically  PSYCH:  insight and judgement intact, memory intact, affect and mood normal     Recent Labs:  CBC RESULTS:   Recent Labs   Lab Test 03/23/18   WBC  7.9   RBC  4.50   HGB  11.2*   HCT  35.3   MCV  78   MCH  24.9*   MCHC  31.7   RDW  16.8*   PLT  360       Last Basic Metabolic Panel:  Recent Labs   Lab Test 03/23/18   NA  140   POTASSIUM  4.1   CHLORIDE  109   JIN  8.6   CO2  23   BUN  36*   CR  1.42*   GLC  129*       Assessment/Plan:  Sacro-iliac pain  Knee pain, unspecified chronicity, unspecified laterality  Acute on chronic, improved. Continue current meds and f/u PRN.     DM type 2, goal HbA1c < 7% (H)  Chronic and well managed. Meds as above. BG checks as ordered.     PMR (polymyalgia rheumatica) (H)  By history, not currently active. Monitor.     Other pulmonary embolism without acute cor pulmonale, unspecified chronicity (H)  Anticoagulation management encounter  Subtherapeutic international normalized ratio (INR)  Chronic issues, low INR. Increase coumadin dose, INR check 3/30    Essential hypertension  Chronic. Meds as ordered. VS per routine.     Iron deficiency anemia due to chronic blood loss  Chronic issue stable last check. F/U hgb PRN    Physical deconditioning  Acute with recent hospital stay, pain. Therapies continue - f/u with progress and DC planning as needed.     Vertigo  Chronic. Meclizine as ordered. F/U with balance center for further work up after TCU discharge.       Orders:  1. INR 1.4. Coumadin 2 mg PO on 3/28 and 3/29. INR 3/30    Total time spent with patient visit at the skilled nursing facility was 35 min including patient visit and review of past records. Greater than 50% of total time spent with counseling and coordinating care due to review history, status and POC to address above  issues    Electronically signed by  ARIN Wright CNP

## 2018-03-28 NOTE — MR AVS SNAPSHOT
"              After Visit Summary   3/28/2018    Shira Gil    MRN: [id number]           Patient Information     Date Of Birth          1935        Visit Information        Provider Department      3/28/2018 9:00 AM Amelia Farooq APRN CNP [facility]        Today's Diagnoses     Sacro-iliac pain    -  1    Knee pain, unspecified chronicity, unspecified laterality        DM type 2, goal HbA1c < 7% (H)        PMR (polymyalgia rheumatica) (H)        Other pulmonary embolism without acute cor pulmonale, unspecified chronicity (H)        Anticoagulation management encounter        Essential hypertension        Iron deficiency anemia due to chronic blood loss        Physical deconditioning        Subtherapeutic international normalized ratio (INR)        Vertigo           Follow-ups after your visit        Who to contact     If you have questions or need follow up information about today's clinic visit or your schedule please contact [facility] directly at [phone].  Normal or non-critical lab and imaging results will be communicated to you by [employer], letter or phone within 4 business days after the clinic has received the results. If you do not hear from us within 7 days, please contact the clinic through [employer] or phone. If you have a critical or abnormal lab result, we will notify you by phone as soon as possible.  Submit refill requests through [employer] or call your pharmacy and they will forward the refill request to us. Please allow 3 business days for your refill to be completed.          Additional Information About Your Visit        [employer] Information     [employer] lets you send messages to your doctor, view your test results, renew your prescriptions, schedule appointments and more. To sign up, go to [email or web address] . Click on \"Log in\" on the left side of the screen, which will take you to the Welcome page. Then click on \"Sign up Now\" on the right " "side of the page.     You will be asked to enter the access code listed below, as well as some personal information. Please follow the directions to create your username and password.     Your access code is: [email or web address]  Expires: 2018  9:47 AM     Your access code will  in 90 days. If you need help or a new code, please call your [facility] clinic or [phone].        Care EveryWhere ID     This is your Care EveryWhere ID. This could be used by other organizations to access your [facility] medical records  [id number]        Your Vitals Were     Pulse Temperature Respirations Height Pulse Oximetry BMI (Body Mass Index)    69 99.4  F (37.4  C) 17 5' 3\" (1.6 m) 95% 31.81 kg/m2       Blood Pressure from Last 3 Encounters:   18 130/77   18 127/70   18 127/79    Weight from Last 3 Encounters:   18 179 lb 9.6 oz (81.5 kg)   18 179 lb 9.6 oz (81.5 kg)   18 178 lb 3.2 oz (80.8 kg)              Today, you had the following     No orders found for display         Today's Medication Changes          These changes are accurate as of 3/28/18 11:13 AM.  If you have any questions, ask your nurse or doctor.               Stop taking these medicines if you haven't already. Please contact your care team if you have questions.     bisacodyl 10 MG Suppository   Commonly known as:  DULCOLAX   Stopped by:  Amelia Farooq APRN CNP           lidocaine 5 % Patch   Commonly known as:  LIDODERM   Stopped by:  Amelia Farooq APRN CNP                    Primary Care Provider    [facility]       No address on file        Equal Access to Services     [facility] : Hadcorina Le, adrienne lugabbyadaha, qaybta kaalmada tatiana covington. So [facility] [phone].    ATENCIÓN: Si habla español, tiene a higuera disposición servicios gratuitos de asistencia lingüística. Llame al [phone].    We comply with applicable federal civil rights laws and " [place] laws. We do not discriminate on the basis of race, color, national origin, age, disability, sex, sexual orientation, or gender identity.            Thank you!     Thank you for choosing [facility]  for your care. Our goal is always to provide you with excellent care. Hearing back from our patients is one way we can continue to improve our services. Please take a few minutes to complete the written survey that you may receive in the mail after your visit with us. Thank you!             Your Updated Medication List - Protect others around you: Learn how to safely use, store and throw away your medicines at www.disposemymeds.org.          This list is accurate as of 3/28/18 11:13 AM.  Always use your most recent med list.                   Brand Name Dispense Instructions for use Diagnosis    ACETAMINOPHEN PO      Take 1,000 mg by mouth 3 times daily Max dose 4000mg in 24 hrs        albuterol 108 (90 BASE) MCG/ACT Inhaler    PROAIR HFA/PROVENTIL HFA/VENTOLIN HFA     Inhale 2 puffs into the lungs 4 times daily as needed for shortness of breath / dyspnea or wheezing        AMLODIPINE BESYLATE PO      Take 10 mg by mouth daily        COUMADIN PO      Take 1 mg by mouth daily        FUROSEMIDE PO      Take 20 mg by mouth 2 times daily        GABAPENTIN PO      Take 200 mg by mouth 3 times daily For 14 days.        GLIPIZIDE PO      Take 5 mg by mouth 2 times daily (before meals)        insulin glargine 100 UNIT/ML injection    LANTUS     Inject 26 Units Subcutaneous daily        LOSARTAN POTASSIUM PO      Take 100 mg by mouth daily        MECLIZINE HCL PO      Take 25 mg by mouth 2 times daily as needed for dizziness        METOPROLOL TARTRATE PO      Take 50 mg by mouth 2 times daily        OMEPRAZOLE PO      Take 20 mg by mouth every morning        OXYBUTYNIN CHLORIDE PO      Take 5 mg by mouth 2 times daily        Polyethylene Glycol 1450 Powd      Take 17 g by mouth daily        PRAVASTATIN  SODIUM PO      Take 40 mg by mouth At Bedtime        Sennosides 17.2 MG Tabs      Take 17.2 mg by mouth 2 times daily        timolol 0.5 % ophthalmic solution    TIMOPTIC     Place 1 drop into both eyes daily        VITAMIN D (CHOLECALCIFEROL) PO      Take 2,000 Units by mouth daily

## 2018-03-30 ENCOUNTER — DISCHARGE SUMMARY NURSING HOME (OUTPATIENT)
Dept: GERIATRICS | Facility: CLINIC | Age: 83
End: 2018-03-30
Payer: MEDICARE

## 2018-03-30 VITALS
TEMPERATURE: 97.9 F | BODY MASS INDEX: 31.82 KG/M2 | SYSTOLIC BLOOD PRESSURE: 149 MMHG | DIASTOLIC BLOOD PRESSURE: 74 MMHG | RESPIRATION RATE: 18 BRPM | OXYGEN SATURATION: 93 % | HEIGHT: 63 IN | WEIGHT: 179.6 LBS | HEART RATE: 71 BPM

## 2018-03-30 DIAGNOSIS — I26.99 OTHER PULMONARY EMBOLISM WITHOUT ACUTE COR PULMONALE, UNSPECIFIED CHRONICITY (H): ICD-10-CM

## 2018-03-30 DIAGNOSIS — Z79.01 ANTICOAGULATION MANAGEMENT ENCOUNTER: ICD-10-CM

## 2018-03-30 DIAGNOSIS — Z51.81 ANTICOAGULATION MANAGEMENT ENCOUNTER: ICD-10-CM

## 2018-03-30 DIAGNOSIS — R42 VERTIGO: ICD-10-CM

## 2018-03-30 DIAGNOSIS — M35.3 PMR (POLYMYALGIA RHEUMATICA) (H): ICD-10-CM

## 2018-03-30 DIAGNOSIS — M53.3 SACRO-ILIAC PAIN: Primary | ICD-10-CM

## 2018-03-30 DIAGNOSIS — R53.81 PHYSICAL DECONDITIONING: ICD-10-CM

## 2018-03-30 DIAGNOSIS — M25.569 KNEE PAIN, UNSPECIFIED CHRONICITY, UNSPECIFIED LATERALITY: ICD-10-CM

## 2018-03-30 DIAGNOSIS — D50.0 IRON DEFICIENCY ANEMIA DUE TO CHRONIC BLOOD LOSS: ICD-10-CM

## 2018-03-30 DIAGNOSIS — E11.9 DM TYPE 2, GOAL HBA1C < 7% (H): ICD-10-CM

## 2018-03-30 DIAGNOSIS — I10 ESSENTIAL HYPERTENSION: ICD-10-CM

## 2018-03-30 DIAGNOSIS — R79.1 SUBTHERAPEUTIC INTERNATIONAL NORMALIZED RATIO (INR): ICD-10-CM

## 2018-03-30 PROCEDURE — 99316 NF DSCHRG MGMT 30 MIN+: CPT | Performed by: NURSE PRACTITIONER

## 2018-03-30 NOTE — PROGRESS NOTES
[facility] DISCHARGE SUMMARY    PATIENT'S NAME: Shira Gil  YOB: 1935  MEDICAL RECORD NUMBER:  [id number]    PRIMARY CARE PROVIDER AND CLINIC RESPONSIBLE AFTER TRANSFER: [facility] [place]      CODE STATUS/ADVANCE DIRECTIVES DISCUSSION:   CPR/Full code        Allergies   Allergen Reactions     Lisinopril        TRANSFERRING PROVIDERS: ARIN Wright CNP, Palomo Varghese MD  DATE OF SNF ADMISSION:  March / 20 / 2018  DATE OF SNF (anticipated) DISCHARGE: March / 31 / 2018  DISCHARGE DISPOSITION: Non-FMG Provider   Nursing Facility: [facility]  stay 03/16/2018 to 03/20/2018.     Condition on Discharge:  Stable.  Function:  Balance: TUG 39.4, Mobility status-2WW-unlimited, Mod I with self care, Independent with Meal prep and med management  Cognitive Scores: SLUMS 20/30 and CPT 5.3/5.6    Equipment: walker, cane and wheelchair    DISCHARGE DIAGNOSIS:   1. Sacro-iliac pain    2. Knee pain, unspecified chronicity, unspecified laterality    3. DM type 2, goal HbA1c < 7% (H)    4. PMR (polymyalgia rheumatica) (H)    5. Subtherapeutic international normalized ratio (INR)    6. Other pulmonary embolism without acute cor pulmonale, unspecified chronicity (H)    7. Anticoagulation management encounter    8. Essential hypertension    9. Iron deficiency anemia due to chronic blood loss    10. Physical deconditioning    11. Vertigo        HPI Nursing Facility Course:  HPI information obtained from: facility chart records, patient report and [facility] chart review.      Sacro-iliac pain  Knee pain, unspecified chronicity, unspecified laterality  Patient admitted to TCU for rehab following hospital stay due to left buttocks pain and pain in left leg. She was evaluated by ortho: no evidence of fracture. MRI of pelvis and lumbar spine- tendinosis and mild trochanteric edema bilaterally. Likely tendinosis is cause of pain. She  was treated with flexeril, gabapentin and Lidoderm patch. She also has chronic knee pain treated with cortisone injections.   --denied pain today. On tylenol, gabapentin    DM type 2, goal HbA1c < 7% (H)  Currently taking Lantus 26 units and Glipizide 5mg. She performs self-monitoring/administration of medications while at home and will resume to do this upon discharge.   Last BG Levels:    AM:     Noon:     Dinner:     HS:     PMR (polymyalgia rheumatica) (H)  Remission for 2 years. No shoulder or back pain. Not on prednisone at this time     Subtherapeutic international normalized ratio (INR)  Other pulmonary embolism without acute cor pulmonale, unspecified chronicity (H)  Anticoagulation management encounter  IVC filter in place. INR today was 1.6. Total of 10mg of coumadin given this past week. Will administer Coumadin 2 mg Fri/Sat (3/30 & 3/31) and 1 mg (4/1 & 4/2). Home care to follow up and assist with INR check on 4/3/18. Dosing to be made by PCP.     Essential hypertension  Taking PTA metop, lasix, losartan and Norvasc.BP Range: 103-149/63-96, HR Range: 43-75 bpm, Wt Range: 178.2lbs 3/20 - 179.6lbs 3/24    Iron deficiency anemia due to chronic blood loss-condition stable  Lab Results   Component Value Date    HGB 11.2 03/23/2018     Physical deconditioning  Lives alone in senior apartment and recently with an adult son.  She uses a cane, walker and wheelchair to independently perform ADL's and community tasks. Discharging on 3/31/18.     Vertigo  Reports chronic issue, currently on PRN meclizine.  Experienced an episode of vertigo during evaluation today, but she stated the medications were effective in relieving her discomforts.       PAST MEDICAL HISTORY:  has no past medical history on file.    DISCHARGE MEDICATIONS:  Current Outpatient Prescriptions   Medication Sig Dispense Refill     ACETAMINOPHEN PO Take 1,000 mg by mouth 3 times daily Max dose 4000mg in 24 hrs        FUROSEMIDE PO Take 20 mg by mouth 2 times daily       GABAPENTIN PO Take 200 mg by mouth 3 times daily For 14 days.       GLIPIZIDE PO Take 5 mg by mouth 2 times daily (before meals)       insulin glargine (LANTUS) 100 UNIT/ML injection Inject 26 Units Subcutaneous daily       LOSARTAN POTASSIUM PO Take 100 mg by mouth daily       MECLIZINE HCL PO Take 25 mg by mouth 3 times daily as needed for dizziness        METOPROLOL TARTRATE PO Take 50 mg by mouth 2 times daily       AMLODIPINE BESYLATE PO Take 10 mg by mouth daily       OMEPRAZOLE PO Take 20 mg by mouth every morning       Polyethylene Glycol 1450 POWD Take 17 g by mouth daily        PRAVASTATIN SODIUM PO Take 40 mg by mouth At Bedtime       albuterol (PROAIR HFA/PROVENTIL HFA/VENTOLIN HFA) 108 (90 BASE) MCG/ACT Inhaler Inhale 2 puffs into the lungs 4 times daily as needed for shortness of breath / dyspnea or wheezing       Sennosides 17.2 MG TABS Take 17.2 mg by mouth 2 times daily       timolol (TIMOPTIC) 0.5 % ophthalmic solution Place 1 drop into both eyes daily       VITAMIN D, CHOLECALCIFEROL, PO Take 2,000 Units by mouth daily       OXYBUTYNIN CHLORIDE PO Take 5 mg by mouth 2 times daily       Warfarin Sodium (COUMADIN PO) Take 1 mg by mouth daily         MEDICATION CHANGES/RATIONALE:   Lantus order changed to 26 units SQ daily  Discontinued: lidocaine patch, bisacodyl suppository, polyethylene glycol (PRN), Initiated and QD dose for constipation instead.  Coumadin order changes with lab draws  Meclizine to be increased to 25mg po TID PRN.    Controlled medications sent with patient:   not applicable/none     ROS:    CONSTITUTIONAL:  Positive for dizziness, EYES:  Denies decreased vision, visual blurring, double vision, eye pain and redness, ENT:  Positive for vertigo and states she has a hx of nasal polyps  CV:  Denies chest pain, tachycardia and irregular heart beat, RESPIRATORY: positive for occasional shortness of breath dyspnea on exertion, denies  "cough , : positive for occasional bladder incontinence, GI:  Denies abdominal pain, constipation, nausea and vomiting, NEURO:  Positive for occasional gait disturbance and syncope , MUSCULOSKELETAL: denies back pain and positive for occasional joint pain (knee), HEME/LYMPH: denies swollen nodes and positive for LE (Lymphedema).     Physical Exam:   Vitals: /74  Pulse 71  Temp 97.9  F (36.6  C)  Resp 18  Ht 5' 3\" (1.6 m)  Wt 179 lb 9.6 oz (81.5 kg)  SpO2 93%  BMI 31.81 kg/m2  BMI= Body mass index is 31.81 kg/(m^2).    GENERAL APPEARANCE:  Alert, oriented, cooperative  ENT:  Mouth and posterior oropharynx normal, moist mucous membranes, normal hearing acuity  EYES:  EOM, conjunctivae, lids, pupils and irises normal, PERRL, sinus palpated and no edema or pain noted  NECK:  No adenopathy,masses or thyromegaly  RESP:  respiratory effort and palpation of chest normal, lungs clear to auscultation   CV:  Palpation and auscultation of heart done , regular rate and rhythm, no murmur, rub, or gallop, +2 pedal pulses, peripheral edema bilateral LE, currently being monitored and managed by PT (Lymphedema)  ABDOMEN:  normal bowel sounds, soft, nontender, no hepatosplenomegaly or other masses, no distension  LYMPHATICS:  Bilateral LE edematous (non-pitting) with compression stockings in place  M/S:   Digits and nails normal, 4+ strength in UE  SKIN:  Inspection of skin and subcutaneous tissue baseline, Palpation of skin and subcutaneous tissue baseline  PSYCH:  oriented X 3, normal insight, judgement and memory    DISCHARGE PLAN:  Occupational Therapy, Physical Therapy, Registered Nurse, Home Health Aide and From:  [facility]  Patient instructed to follow-up with:  PCP in 7 days      Current [place] scheduled appointments: Patient self-manages her appointments and will be rescheduling upon discharge.   INR is due 4/3/18, which will be addressed by HC. PCP will provide future dosing.     MTM referral needed " and placed by this provider: No    Pending labs: none  SNF labs   CBC RESULTS:   Recent Labs   Lab Test 03/23/18   WBC  7.9   RBC  4.50   HGB  11.2*   HCT  35.3   MCV  78   MCH  24.9*   MCHC  31.7   RDW  16.8*   PLT  360       Last Basic Metabolic Panel:  Recent Labs   Lab Test 03/23/18   NA  140   POTASSIUM  4.1   CHLORIDE  109   JIN  8.6   CO2  23   BUN  36*   CR  1.42*   GLC  129*     Discharge Treatments: patient to continue to wear compression stockings to LE, follow up with therapy PRN for lymphedema management.      TOTAL DISCHARGE TIME:   Greater than 30 minutes     Fauzia Peters NP student    This patient was seen along with a nurse practitioner student.  The histories and reviews of systems were obtained by student and confirmed by myself. All objective, assessments and plans were completed by myself.   Amelia Farooq    Electronically signed by:  ARIN Wright CNP         Documentation of Face-to-Face and Certification for Home Health Services     Patient: Shira Gil   YOB: 1935  MR Number: [id number]  Today's Date: 3/30/2018    I certify that patient: Shira Gil is under my care and that I, or a nurse practitioner or physician's assistant working with me, had a face-to-face encounter that meets the physician face-to-face encounter requirements with this patient on: 3/30/2018.    This encounter with the patient was in whole, or in part, for the following medical condition, which is the primary reason for home health care: weakness, syncope concerns and assistance with ADLs. .    I certify that, based on my findings, the following services are medically necessary home health services: Nursing, Occupational Therapy, Physical Therapy and HHA.    My clinical findings support the need for the above services because: Nurse is needed: To provide assessment and oversight required in the home to assure adherence to the medical plan due to: dyspnea, weakness and  syncopal episodes, Occupational Therapy Services are needed to assess and treat cognitive ability and address ADL safety due to impairment in assistance with ADLs., Physical Therapy Services are needed to assess and treat the following functional impairments: weakness and ADL assistance. and A will provide hands on care with assistance needs    Further, I certify that my clinical findings support that this patient is homebound (i.e. absences from home require considerable and taxing effort and are for medical reasons or [employer] services or infrequently or of short duration when for other reasons) because: Leaving home is medically contraindicated for the following reason(s): Dyspnea on exertion that makes it so they cannot leave their home for needed services without clinical deterioration. and joint pain and sycopal episodes..    Based on the above findings. I certify that this patient is confined to the home and needs intermittent skilled nursing care, physical therapy and/or speech therapy.  The patient is under my care, and I have initiated the establishment of the plan of care.  This patient will be followed by a physician who will periodically review the plan of care.  Physician/Provider to provide follow up care: [facility]    Responsible Medicare certified PECOS Physician: Palomo Varghese MD  Physician Signature: See electronic signature associated with these discharge orders.  Date: 3/30/2018

## 2019-02-06 ENCOUNTER — APPOINTMENT (RX ONLY)
Dept: URBAN - METROPOLITAN AREA CLINIC 5 | Facility: CLINIC | Age: 84
Setting detail: DERMATOLOGY
End: 2019-02-06

## 2019-02-06 DIAGNOSIS — L81.4 OTHER MELANIN HYPERPIGMENTATION: ICD-10-CM

## 2019-02-06 DIAGNOSIS — L57.0 ACTINIC KERATOSIS: ICD-10-CM

## 2019-02-06 DIAGNOSIS — Z71.89 OTHER SPECIFIED COUNSELING: ICD-10-CM

## 2019-02-06 DIAGNOSIS — L82.1 OTHER SEBORRHEIC KERATOSIS: ICD-10-CM

## 2019-02-06 PROBLEM — D48.5 NEOPLASM OF UNCERTAIN BEHAVIOR OF SKIN: Status: ACTIVE | Noted: 2019-02-06

## 2019-02-06 PROCEDURE — 17000 DESTRUCT PREMALG LESION: CPT | Mod: 59

## 2019-02-06 PROCEDURE — 17003 DESTRUCT PREMALG LES 2-14: CPT

## 2019-02-06 PROCEDURE — ? BIOPSY BY SHAVE METHOD

## 2019-02-06 PROCEDURE — ? COUNSELING

## 2019-02-06 PROCEDURE — ? SUNSCREEN RECOMMENDATIONS

## 2019-02-06 PROCEDURE — ? LIQUID NITROGEN

## 2019-02-06 PROCEDURE — 99213 OFFICE O/P EST LOW 20 MIN: CPT | Mod: 25

## 2019-02-06 PROCEDURE — 11102 TANGNTL BX SKIN SINGLE LES: CPT

## 2019-02-06 ASSESSMENT — LOCATION DETAILED DESCRIPTION DERM
LOCATION DETAILED: RIGHT INFERIOR MEDIAL FOREHEAD
LOCATION DETAILED: UPPER STERNUM
LOCATION DETAILED: LEFT DISTAL POSTERIOR UPPER ARM
LOCATION DETAILED: MIDDLE STERNUM
LOCATION DETAILED: LEFT CENTRAL MALAR CHEEK
LOCATION DETAILED: SUPERIOR THORACIC SPINE
LOCATION DETAILED: RIGHT DISTAL POSTERIOR UPPER ARM
LOCATION DETAILED: EPIGASTRIC SKIN
LOCATION DETAILED: LEFT NASAL SIDEWALL
LOCATION DETAILED: RIGHT PROXIMAL DORSAL FOREARM
LOCATION DETAILED: RIGHT SUPERIOR MEDIAL UPPER BACK
LOCATION DETAILED: LEFT PROXIMAL DORSAL FOREARM

## 2019-02-06 ASSESSMENT — LOCATION ZONE DERM
LOCATION ZONE: TRUNK
LOCATION ZONE: ARM
LOCATION ZONE: FACE
LOCATION ZONE: NOSE

## 2019-02-06 ASSESSMENT — LOCATION SIMPLE DESCRIPTION DERM
LOCATION SIMPLE: UPPER BACK
LOCATION SIMPLE: LEFT CHEEK
LOCATION SIMPLE: LEFT FOREARM
LOCATION SIMPLE: RIGHT FOREARM
LOCATION SIMPLE: ABDOMEN
LOCATION SIMPLE: RIGHT POSTERIOR UPPER ARM
LOCATION SIMPLE: LEFT NOSE
LOCATION SIMPLE: RIGHT FOREHEAD
LOCATION SIMPLE: LEFT POSTERIOR UPPER ARM
LOCATION SIMPLE: CHEST
LOCATION SIMPLE: RIGHT UPPER BACK

## 2019-02-06 NOTE — PROCEDURE: BIOPSY BY SHAVE METHOD
Body Location Override (Optional - Billing Will Still Be Based On Selected Body Map Location If Applicable): right chest
Silver Nitrate Text: The wound bed was treated with silver nitrate after the biopsy was performed.
Was A Bandage Applied: Yes
Dressing: bandage
Post-Care Instructions: I reviewed with the patient in detail post-care instructions. Patient is to keep the biopsy site dry overnight, and then apply bacitracin twice daily until healed. Patient may apply hydrogen peroxide soaks to remove any crusting.
Type Of Destruction Used: Curettage
Electrodesiccation And Curettage Text: The wound bed was treated with electrodesiccation and curettage after the biopsy was performed.
Lab: [id number]
Consent: Written consent was obtained and risks were reviewed including but not limited to scarring, infection, bleeding, scabbing, incomplete removal, nerve damage and allergy to anesthesia.
Curettage Text: The wound bed was treated with curettage after the biopsy was performed.
Detail Level: Detailed
Biopsy Method: Dermablade
Hemostasis: Aluminum Chloride
Notification Instructions: Patient will be notified of biopsy results. However, patient instructed to call the office if not contacted within 2 weeks. Results will be faxed to PCP once they are available.
Bill For Surgical Tray: no
Wound Care: Vaseline
Cryotherapy Text: The wound bed was treated with cryotherapy after the biopsy was performed.
Size Of Lesion In Cm: 0
Lab Facility: [id number]
Anesthesia Type: 1% lidocaine with epinephrine
Billing Type: Third-Party Bill
Anesthesia Volume In Cc: 0.5
Biopsy Type: H and E
Depth Of Biopsy: dermis
Electrodesiccation Text: The wound bed was treated with electrodesiccation after the biopsy was performed.

## 2019-02-06 NOTE — PROCEDURE: LIQUID NITROGEN
Detail Level: Simple
Post-Care Instructions: I reviewed with the patient in detail post-care instructions. Patient is to wear sunprotection, and avoid picking at any of the treated lesions. Pt may apply Vaseline to crusted or scabbing areas.
Duration Of Freeze Thaw-Cycle (Seconds): 5
Render Post-Care Instructions In Note?: yes
Consent: The patient's consent was obtained including but not limited to risks of crusting, scabbing, blistering, scarring, darker or lighter pigmentary change, recurrence, incomplete removal and infection.

## 2022-04-20 ENCOUNTER — HOSPITAL ENCOUNTER (OUTPATIENT)
Dept: CT IMAGING | Facility: CLINIC | Age: 87
Discharge: HOME | End: 2022-04-20
Attending: PHYSICIAN ASSISTANT
Payer: MEDICARE

## 2022-04-20 ENCOUNTER — HOSPITAL ENCOUNTER (OUTPATIENT)
Dept: GENERAL RADIOLOGY | Facility: CLINIC | Age: 87
Discharge: HOME | End: 2022-04-20
Attending: PHYSICIAN ASSISTANT
Payer: MEDICARE

## 2022-04-20 DIAGNOSIS — K59.00 CONSTIPATION: ICD-10-CM

## 2022-04-20 DIAGNOSIS — R63.4 WEIGHT LOSS: ICD-10-CM

## 2022-04-20 DIAGNOSIS — R68.81 EARLY SATIETY: ICD-10-CM

## 2022-04-20 DIAGNOSIS — R63.0 DECREASED APPETITE: ICD-10-CM

## 2022-04-20 LAB
CREAT BLD-MCNC: 1.2 MG/DL (ref 0.5–1)
GFR SERPL CREATININE-BSD FRML MDRD: 44 ML/MIN/1.73M2

## 2022-04-20 PROCEDURE — 250N000009 HC RX 250: Performed by: PHYSICIAN ASSISTANT

## 2022-04-20 PROCEDURE — 250N000011 HC RX IP 250 OP 636: Performed by: PHYSICIAN ASSISTANT

## 2022-04-20 PROCEDURE — 82565 ASSAY OF CREATININE: CPT

## 2022-04-20 PROCEDURE — 74177 CT ABD & PELVIS W/CONTRAST: CPT

## 2022-04-20 PROCEDURE — 74240 X-RAY XM UPR GI TRC 1CNTRST: CPT

## 2022-04-20 RX ORDER — IOPAMIDOL 755 MG/ML
88 INJECTION, SOLUTION INTRAVASCULAR ONCE
Status: COMPLETED | OUTPATIENT
Start: 2022-04-20 | End: 2022-04-20

## 2022-04-20 RX ADMIN — IOPAMIDOL 88 ML: 755 INJECTION, SOLUTION INTRAVENOUS at 10:08

## 2022-04-20 RX ADMIN — SODIUM CHLORIDE 65 ML: 9 INJECTION, SOLUTION INTRAVENOUS at 10:08

## 2022-08-19 ENCOUNTER — ANESTHESIA (OUTPATIENT)
Dept: GASTROENTEROLOGY | Facility: CLINIC | Age: 87
End: 2022-08-19
Payer: MEDICARE

## 2022-08-19 ENCOUNTER — HOSPITAL ENCOUNTER (OUTPATIENT)
Facility: CLINIC | Age: 87
Discharge: HOME | End: 2022-08-19
Attending: INTERNAL MEDICINE | Admitting: INTERNAL MEDICINE
Payer: MEDICARE

## 2022-08-19 ENCOUNTER — ANESTHESIA EVENT (OUTPATIENT)
Dept: GASTROENTEROLOGY | Facility: CLINIC | Age: 87
End: 2022-08-19
Payer: MEDICARE

## 2022-08-19 VITALS
DIASTOLIC BLOOD PRESSURE: 91 MMHG | SYSTOLIC BLOOD PRESSURE: 171 MMHG | RESPIRATION RATE: 16 BRPM | OXYGEN SATURATION: 98 % | HEART RATE: 63 BPM

## 2022-08-19 LAB — UPPER GI ENDOSCOPY: NORMAL

## 2022-08-19 PROCEDURE — 250N000009 HC RX 250: Performed by: NURSE ANESTHETIST, CERTIFIED REGISTERED

## 2022-08-19 PROCEDURE — 370N000017 HC ANESTHESIA TECHNICAL FEE, PER MIN: Performed by: INTERNAL MEDICINE

## 2022-08-19 PROCEDURE — 258N000003 HC RX IP 258 OP 636: Performed by: NURSE ANESTHETIST, CERTIFIED REGISTERED

## 2022-08-19 PROCEDURE — 999N000010 HC STATISTIC ANES STAT CODE-CRNA PER MINUTE: Performed by: INTERNAL MEDICINE

## 2022-08-19 PROCEDURE — 43239 EGD BIOPSY SINGLE/MULTIPLE: CPT | Performed by: INTERNAL MEDICINE

## 2022-08-19 PROCEDURE — 88305 TISSUE EXAM BY PATHOLOGIST: CPT | Mod: TC | Performed by: INTERNAL MEDICINE

## 2022-08-19 PROCEDURE — 250N000011 HC RX IP 250 OP 636: Performed by: NURSE ANESTHETIST, CERTIFIED REGISTERED

## 2022-08-19 RX ORDER — NALOXONE HYDROCHLORIDE 0.4 MG/ML
0.4 INJECTION, SOLUTION INTRAMUSCULAR; INTRAVENOUS; SUBCUTANEOUS
Status: CANCELLED | OUTPATIENT
Start: 2022-08-19

## 2022-08-19 RX ORDER — LIDOCAINE HYDROCHLORIDE 20 MG/ML
INJECTION, SOLUTION INFILTRATION; PERINEURAL PRN
Status: DISCONTINUED | OUTPATIENT
Start: 2022-08-19 | End: 2022-08-19

## 2022-08-19 RX ORDER — SODIUM CHLORIDE, SODIUM LACTATE, POTASSIUM CHLORIDE, CALCIUM CHLORIDE 600; 310; 30; 20 MG/100ML; MG/100ML; MG/100ML; MG/100ML
INJECTION, SOLUTION INTRAVENOUS CONTINUOUS PRN
Status: DISCONTINUED | OUTPATIENT
Start: 2022-08-19 | End: 2022-08-19

## 2022-08-19 RX ORDER — PROCHLORPERAZINE MALEATE 5 MG
5 TABLET ORAL EVERY 6 HOURS PRN
Status: CANCELLED | OUTPATIENT
Start: 2022-08-19

## 2022-08-19 RX ORDER — NALOXONE HYDROCHLORIDE 0.4 MG/ML
0.2 INJECTION, SOLUTION INTRAMUSCULAR; INTRAVENOUS; SUBCUTANEOUS
Status: CANCELLED | OUTPATIENT
Start: 2022-08-19

## 2022-08-19 RX ORDER — FLUMAZENIL 0.1 MG/ML
0.2 INJECTION, SOLUTION INTRAVENOUS
Status: CANCELLED | OUTPATIENT
Start: 2022-08-19 | End: 2022-08-19

## 2022-08-19 RX ORDER — PROPOFOL 10 MG/ML
INJECTION, EMULSION INTRAVENOUS CONTINUOUS PRN
Status: DISCONTINUED | OUTPATIENT
Start: 2022-08-19 | End: 2022-08-19

## 2022-08-19 RX ORDER — ONDANSETRON 4 MG/1
4 TABLET, ORALLY DISINTEGRATING ORAL EVERY 6 HOURS PRN
Status: CANCELLED | OUTPATIENT
Start: 2022-08-19

## 2022-08-19 RX ORDER — ONDANSETRON 2 MG/ML
4 INJECTION INTRAMUSCULAR; INTRAVENOUS EVERY 6 HOURS PRN
Status: CANCELLED | OUTPATIENT
Start: 2022-08-19

## 2022-08-19 RX ADMIN — LIDOCAINE HYDROCHLORIDE 60 MG: 20 INJECTION, SOLUTION INFILTRATION; PERINEURAL at 09:42

## 2022-08-19 RX ADMIN — PROPOFOL 150 MCG/KG/MIN: 10 INJECTION, EMULSION INTRAVENOUS at 09:44

## 2022-08-19 RX ADMIN — SODIUM CHLORIDE, POTASSIUM CHLORIDE, SODIUM LACTATE AND CALCIUM CHLORIDE: 600; 310; 30; 20 INJECTION, SOLUTION INTRAVENOUS at 09:42

## 2022-08-19 ASSESSMENT — ACTIVITIES OF DAILY LIVING (ADL): ADLS_ACUITY_SCORE: 35

## 2022-08-19 NOTE — H&P
PRE-PROCEDURE H&P    CHIEF COMPLAINT / REASON FOR PROCEDURE:  Early satiety, dysphagia    PERTINENT HISTORY :    Past Medical History:   Diagnosis Date     Anemia, iron deficiency      Cancer (H)      Cardiomegaly      Chronic kidney disease      Diabetes (H)      Dysphagia      Hypertension      PMR (polymyalgia rheumatica) (H)      Pulmonary embolism (H)      Schatzki's ring      Sleep apnea       Past Surgical History:   Procedure Laterality Date     HERNIA REPAIR       NASAL POLYP SURGERY           Bleeding tendencies:  No    Relevant Family History:  NONE     Relevant Social History:  NONE      A relevant review of systems was performed and was positive     Current symptoms include: eating less, feeling full, food seems to sit    ALLERGIES/SENSITIVITIES:   Allergies   Allergen Reactions     Lisinopril        CURRENT MEDICATIONS:   Prior to Admission Medications   Prescriptions Last Dose Informant Patient Reported? Taking?   ACETAMINOPHEN PO 8/18/2022 at Unknown time  Yes Yes   Sig: Take 1,000 mg by mouth 3 times daily Max dose 4000mg in 24 hrs   AMLODIPINE BESYLATE PO 8/18/2022 at Unknown time  Yes Yes   Sig: Take 10 mg by mouth daily   FUROSEMIDE PO 8/18/2022 at Unknown time  Yes Yes   Sig: Take 20 mg by mouth 2 times daily   GLIPIZIDE PO 8/18/2022 at Unknown time  Yes Yes   Sig: Take 5 mg by mouth 2 times daily (before meals)   LOSARTAN POTASSIUM PO 8/18/2022 at Unknown time  Yes Yes   Sig: Take 100 mg by mouth daily   MECLIZINE HCL PO 8/18/2022 at Unknown time  Yes Yes   Sig: Take 25 mg by mouth 3 times daily as needed for dizziness    METOPROLOL TARTRATE PO 8/18/2022 at Unknown time  Yes Yes   Sig: Take 50 mg by mouth 2 times daily   OMEPRAZOLE PO 8/18/2022 at Unknown time  Yes Yes   Sig: Take 20 mg by mouth every morning   OXYBUTYNIN CHLORIDE PO 8/18/2022 at Unknown time  Yes Yes   Sig: Take 5 mg by mouth 2 times daily   PRAVASTATIN SODIUM PO 8/18/2022 at Unknown time  Yes Yes   Sig: Take 40 mg by mouth At  Bedtime   Polyethylene Glycol 1450 POWD 8/18/2022 at Unknown time  Yes Yes   Sig: Take 17 g by mouth daily    Sennosides 17.2 MG TABS 8/18/2022 at Unknown time  Yes Yes   Sig: Take 17.2 mg by mouth 2 times daily   VITAMIN D, CHOLECALCIFEROL, PO 8/18/2022 at Unknown time  Yes Yes   Sig: Take 2,000 Units by mouth daily   Warfarin Sodium (COUMADIN PO) 8/13/2022  Yes No   Sig: Take 1 mg by mouth daily   albuterol (PROAIR HFA/PROVENTIL HFA/VENTOLIN HFA) 108 (90 BASE) MCG/ACT Inhaler 8/18/2022 at Unknown time  Yes Yes   Sig: Inhale 2 puffs into the lungs 4 times daily as needed for shortness of breath / dyspnea or wheezing   timolol (TIMOPTIC) 0.5 % ophthalmic solution 8/18/2022 at Unknown time  Yes Yes   Sig: Place 1 drop into both eyes daily      Facility-Administered Medications: None        PRE-SEDATION ASSESSMENT:    Lung Exam:  normal  Heart Exam:  normal  Airway Exam: normal  Previous reaction to anesthesia/sedation:   No  Sedation plan based on assessment: MAC  ASA Classification:  3 - Severe systemic disease, but not incapacitating    Comments: coumadin held. Risks explained.    IMPRESSION:  Rule out stetnosis, ulcer, etc.    PLAN:  egd     Em Hartley MD, MD  [facility]  Office: [phone]

## 2022-08-19 NOTE — ANESTHESIA POSTPROCEDURE EVALUATION
Patient: Shira Gil    Procedure: Procedure(s):  ESOPHAGOGASTRODUODENOSCOPY, WITH BIOPSY       Anesthesia Type:  MAC    Note:  Disposition: Outpatient   Postop Pain Control: Uneventful            Sign Out: Well controlled pain   PONV: No   Neuro/Psych: Uneventful            Sign Out: Acceptable/Baseline neuro status   Airway/Respiratory: Uneventful            Sign Out: Acceptable/Baseline resp. status   CV/Hemodynamics: Uneventful            Sign Out: Acceptable CV status; No obvious hypovolemia; No obvious fluid overload   Other NRE: NONE   DID A NON-ROUTINE EVENT OCCUR? No           Last vitals:  Vitals Value Taken Time   /91 08/19/22 1020   Temp     Pulse 59 08/19/22 1026   Resp 52 08/19/22 1026   SpO2 98 % 08/19/22 1025   Vitals shown include unvalidated device data.    Electronically Signed By: Yulisa Carrero MD  August 19, 2022  2:29 PM

## 2022-08-19 NOTE — ANESTHESIA PREPROCEDURE EVALUATION
Anesthesia Pre-Procedure Evaluation    Patient: Shira Gil   MRN: [id number] : 1935        Procedure : Procedure(s):  ESOPHAGOGASTRODUODENOSCOPY (EGD)          No past medical history on file.   No past surgical history on file.   Allergies   Allergen Reactions     Lisinopril       Social History     Tobacco Use     Smoking status: Not on file     Smokeless tobacco: Not on file   Substance Use Topics     Alcohol use: Not on file      Wt Readings from Last 1 Encounters:   18 81.5 kg (179 lb 9.6 oz)        Anesthesia Evaluation   Pt has had prior anesthetic.     No history of anesthetic complications       ROS/MED HX  ENT/Pulmonary:    (-) recent URI   Neurologic:     (+) peripheral neuropathy,     Cardiovascular:     (+) hypertension-----    METS/Exercise Tolerance: 3 - Able to walk 1-2 blocks without stopping    Hematologic:     (+) History of blood clots (H/o PE), pt is anticoagulated, anemia,     Musculoskeletal:       GI/Hepatic:    (-) liver disease   Renal/Genitourinary:       Endo:     (+) type II DM, Obesity,  (-) thyroid disease   Psychiatric/Substance Use:       Infectious Disease:       Malignancy:       Other:            Physical Exam    Airway        Mallampati: II   TM distance: > 3 FB   Neck ROM: full   Mouth opening: > 3 cm    Respiratory Devices and Support         Dental  no notable dental history         Cardiovascular          Rhythm and rate: regular and normal     Pulmonary           breath sounds clear to auscultation           OUTSIDE LABS:  CBC:   Lab Results   Component Value Date    WBC 7.9 2018    HGB 11.2 (A) 2018    HCT 35.3 2018     2018     BMP:   Lab Results   Component Value Date     2018    POTASSIUM 4.1 2018    CHLORIDE 109 2018    CO2 23 2018    BUN 36 (A) 2018    CR 1.2 (H) 2022    CR 1.42 (A) 2018     (A) 2018     COAGS: No results found for: PTT, INR, FIBR  POC: No  results found for: BGM, HCG, HCGS  HEPATIC: No results found for: ALBUMIN, PROTTOTAL, ALT, AST, GGT, ALKPHOS, BILITOTAL, BILIDIRECT, DIMITRI  OTHER:   Lab Results   Component Value Date    JIN 8.6 03/23/2018       Anesthesia Plan    ASA Status:  3      Anesthesia Type: MAC.     - Reason for MAC: straight local not clinically adequate   Induction: Propofol.   Maintenance: TIVA.        Consents    Anesthesia Plan(s) and associated risks, benefits, and realistic alternatives discussed. Questions answered and patient/representative(s) expressed understanding.     - Discussed: Risks, Benefits and Alternatives for BOTH SEDATION and the PROCEDURE were discussed     - Discussed with:  Patient         Postoperative Care       PONV prophylaxis: Background Propofol Infusion, Ondansetron (or other 5HT-3)     Comments:                Yulisa Carrero MD

## 2022-08-19 NOTE — ANESTHESIA CARE TRANSFER NOTE
Patient: Shira Gil    Procedure: Procedure(s):  ESOPHAGOGASTRODUODENOSCOPY, WITH BIOPSY       Diagnosis: Early satiety [R68.81]  Diagnosis Additional Information: No value filed.    Anesthesia Type:   MAC     Note:    Oropharynx: oropharynx clear of all foreign objects  Level of Consciousness: awake  Oxygen Supplementation: room air    Independent Airway: airway patency satisfactory and stable  Dentition: dentition unchanged      Patient transferred to: Phase II    Handoff Report: Identifed the Patient, Identified the Reponsible Provider, Reviewed the pertinent medical history, Discussed the surgical course, Reviewed Intra-OP anesthesia mangement and issues during anesthesia, Set expectations for post-procedure period and Allowed opportunity for questions and acknowledgement of understanding      Vitals:  Vitals Value Taken Time   BP     Temp     Pulse     Resp     SpO2         Electronically Signed By: ARIN Ray CRNA  August 19, 2022  10:00 AM

## 2022-08-22 LAB
PATH REPORT.COMMENTS IMP SPEC: NORMAL
PATH REPORT.COMMENTS IMP SPEC: NORMAL
PATH REPORT.FINAL DX SPEC: NORMAL
PATH REPORT.GROSS SPEC: NORMAL
PATH REPORT.MICROSCOPIC SPEC OTHER STN: NORMAL
PATH REPORT.RELEVANT HX SPEC: NORMAL
PHOTO IMAGE: NORMAL

## 2022-08-22 PROCEDURE — 88305 TISSUE EXAM BY PATHOLOGIST: CPT | Mod: 26 | Performed by: PATHOLOGY

## 2024-01-11 ENCOUNTER — TRANSCRIBE ORDERS (OUTPATIENT)
Dept: OTHER | Age: 89
End: 2024-01-11

## 2024-01-11 DIAGNOSIS — H91.93 BILATERAL HEARING LOSS, UNSPECIFIED HEARING LOSS TYPE: Primary | ICD-10-CM
